# Patient Record
Sex: MALE | URBAN - METROPOLITAN AREA
[De-identification: names, ages, dates, MRNs, and addresses within clinical notes are randomized per-mention and may not be internally consistent; named-entity substitution may affect disease eponyms.]

---

## 2017-05-22 ENCOUNTER — PRE VISIT (OUTPATIENT)
Dept: NEUROLOGY | Facility: CLINIC | Age: 77
End: 2017-05-22

## 2017-05-22 NOTE — TELEPHONE ENCOUNTER
"PREVISIT INFORMATION                                                    Andi Parrish scheduled for future visit at [facility] specialty clinics.    Patient is scheduled to see Dr Humphrey on 5/23/17  Reason for visit: Facial numbness, getting worse over the past couple years. Inside of mouth, gums and tongue are numb. \"Stiff arm\" (mild stroke) and that was when the numbness in the mouth started.   Referring provider Dr Almaguer at [facility]  Has patient seen previous specialist? Yes.  Name of provider unknow, Clinic/Facility [facility].   Medical Records:  We do not have records. The pt was rescheduled to June 20th because we do not have any records.    REVIEW                                                      New patient packet mailed to patient: Yes  Medication reconciliation complete: No      Current Outpatient Prescriptions   Medication Sig Dispense Refill     CRESTOR OR 1 TABLET DAILY       ASPIRIN 81 MG OR CPEP 1 CAPSULE DAILY       TAMIFLU 75 MG OR CAPS 1 CAPSULE DAILY x 10 days 10 0       Allergies: Review of patient's allergies indicates no known allergies.        PLAN/FOLLOW-UP NEEDED                                                      The following is needed before upcoming appointment. [facility] records. We can access his PCP (Dr Almaguer) through Care Everywhere.     Patient Reminders Given:  Please, make sure you bring an updated list of your medications.   If you are having a procedure, please, present 15 minutes early.  If you need to cancel or reschedule,please call [phone].    Joyce Sanchez    "

## 2017-06-06 ENCOUNTER — TELEPHONE (OUTPATIENT)
Dept: NEUROLOGY | Facility: CLINIC | Age: 77
End: 2017-06-06

## 2017-06-06 NOTE — TELEPHONE ENCOUNTER
Care everywhere is accessed but patient had tests(MRI and EEG) done at [facility] and [facility]. Pt states that she had PRESTON sent to both clinics, he is going to fax a copy of PRESTON to me so I can follow up and make sure that we have copy of these tests before appt.  We did get a  Paper copy of records from [place] but not images for Dr. Humphrey to look at.  Darla Severin-Brown, LPN

## 2017-06-06 NOTE — TELEPHONE ENCOUNTER
[facility] Call Center    Phone Message    Name of Caller: Andi    Phone Number: Cell number on file:    Telephone Information:   Mobile [phone]       Best time to return call: any    May a detailed message be left on voicemail: yes    Relation to patient: Self    Reason for Call: Other: Patient is calling to follow up and see if the clinic has recieved his records. Please advise.     Action Taken: Message routed to:  Adult Clinics: Neurology p [phone]

## 2017-06-14 NOTE — TELEPHONE ENCOUNTER
PRESTON for Neil faxed to [phone] requesting records and MRIs on disk again.    Darla Severin-Brown, LPN

## 2017-06-19 ENCOUNTER — PRE VISIT (OUTPATIENT)
Dept: NEUROLOGY | Facility: CLINIC | Age: 77
End: 2017-06-19

## 2017-06-19 NOTE — TELEPHONE ENCOUNTER
Writer tried to reach the pt to complete the previsit but there was no answer, a message was left on their VM requesting a call back to the clinic.  Joyce Sanchez RN

## 2017-06-20 ENCOUNTER — OFFICE VISIT (OUTPATIENT)
Dept: NEUROLOGY | Facility: CLINIC | Age: 77
End: 2017-06-20
Payer: COMMERCIAL

## 2017-06-20 VITALS
BODY MASS INDEX: 28.67 KG/M2 | WEIGHT: 172.1 LBS | HEIGHT: 65 IN | HEART RATE: 51 BPM | DIASTOLIC BLOOD PRESSURE: 67 MMHG | SYSTOLIC BLOOD PRESSURE: 128 MMHG | OXYGEN SATURATION: 98 %

## 2017-06-20 DIAGNOSIS — K13.79 ORAL PAIN: ICD-10-CM

## 2017-06-20 DIAGNOSIS — H91.91 ACUTE HEARING LOSS OF RIGHT EAR: Primary | ICD-10-CM

## 2017-06-20 PROCEDURE — 99203 OFFICE O/P NEW LOW 30 MIN: CPT | Performed by: PSYCHIATRY & NEUROLOGY

## 2017-06-20 RX ORDER — ZOLPIDEM TARTRATE 10 MG/1
1.5 TABLET ORAL AT BEDTIME
Refills: 1 | COMMUNITY
Start: 2017-05-31 | End: 2021-01-07

## 2017-06-20 RX ORDER — FLUVASTATIN 20 MG/1
1 CAPSULE ORAL DAILY
Refills: 3 | COMMUNITY
Start: 2017-03-06 | End: 2021-01-07

## 2017-06-20 RX ORDER — NITROGLYCERIN 0.4 MG/1
0.4 TABLET SUBLINGUAL PRN
COMMUNITY
Start: 2013-03-07

## 2017-06-20 RX ORDER — CLOPIDOGREL BISULFATE 75 MG/1
1 TABLET ORAL DAILY
Refills: 3 | COMMUNITY
Start: 2017-03-31

## 2017-06-20 RX ORDER — PREGABALIN 50 MG/1
CAPSULE ORAL
Qty: 25 CAPSULE | Refills: 1 | Status: SHIPPED | OUTPATIENT
Start: 2017-06-20 | End: 2021-01-07

## 2017-06-20 RX ORDER — PREGABALIN 50 MG/1
CAPSULE ORAL
Qty: 90 CAPSULE | Status: CANCELLED | OUTPATIENT
Start: 2017-06-20

## 2017-06-20 RX ORDER — PREGABALIN 100 MG
1 CAPSULE ORAL 3 TIMES DAILY
Refills: 5 | COMMUNITY
Start: 2017-05-31 | End: 2017-10-24

## 2017-06-20 RX ORDER — DOXYCYCLINE 100 MG/1
CAPSULE ORAL PRN
Refills: 5 | COMMUNITY
Start: 2017-04-05

## 2017-06-20 RX ORDER — LISINOPRIL 10 MG/1
1 TABLET ORAL DAILY
Refills: 3 | COMMUNITY
Start: 2017-03-22 | End: 2021-01-07

## 2017-06-20 RX ORDER — TAMSULOSIN HYDROCHLORIDE 0.4 MG/1
CAPSULE ORAL PRN
Refills: 3 | COMMUNITY
Start: 2017-03-21 | End: 2021-01-03

## 2017-06-20 RX ORDER — PREDNISONE 20 MG/1
60 TABLET ORAL DAILY
Qty: 21 TABLET | Refills: 1 | Status: SHIPPED | OUTPATIENT
Start: 2017-06-20 | End: 2017-10-24

## 2017-06-20 ASSESSMENT — PAIN SCALES - GENERAL: PAINLEVEL: MODERATE PAIN (5)

## 2017-06-20 NOTE — MR AVS SNAPSHOT
After Visit Summary   6/20/2017    Andi Parrish    MRN: [id number]           Patient Information     Date Of Birth          1940        Visit Information        Provider Department      6/20/2017 2:00 PM Jeanmarie Humphrey MD [facility]        Today's Diagnoses     Acute hearing loss of right ear    -  1      Care Instructions    Thank you for choosing [facility] in [place]. It was a pleasure to see you for your office visit today.     The following is a summary of your office visit:    Medication started today: None. After stopping the Lyrica we will add Cymbalta.     Medication stopped today: Decrease your Lyrica dose to 50mg, one capsule three times a day for 5 days and then 1 capsule twice daily for 3 days and then stop. Call us after you are off this medication and we can send a prescription for Cymbalta.     Medication dose change: None    Appointments Made today:   1) We will arrange the Brain MRI and appt with Dr Contreras at the  and let you know the date/time.     Nurse/clinic contact information: [facility] [phone]    Further instructions for your care: Call if your symptoms change or worsen.     Take the extra course of Prednisone as instructed on the prescription sent to your pharmacy. If ENT decides to inject a steroid directly into the ear you can stop the oral prednisone.               Follow-ups after your visit        Your next 10 appointments already scheduled     Jun 21, 2017  9:30 AM CDT   Walk In From ENT with Ann Jara   [facility] ([facility])    [address] [place] [id number] [phone]            Jun 21, 2017 10:00 AM CDT   (Arrive by 9:45 AM)   New Patient Visit with Reinaldo Armstrong MD   [facility] ([facility])    [address] [place] [address] [id number] [phone]     "          Who to contact     If you have questions or need follow up information about today's clinic visit or your schedule please contact [facility] directly at [phone].  Normal or non-critical lab and imaging results will be communicated to you by MyChart, letter or phone within 4 business days after the clinic has received the results. If you do not hear from us within 7 days, please contact the clinic through MyChart or phone. If you have a critical or abnormal lab result, we will notify you by phone as soon as possible.  Submit refill requests through [employer] or call your pharmacy and they will forward the refill request to us. Please allow 3 business days for your refill to be completed.          Additional Information About Your Visit        Superplayer Information     [employer] is an electronic gateway that provides easy, online access to your medical records. With [employer]player, you can request a clinic appointment, read your test results, renew a prescription or communicate with your care team.     To sign up for [employer] visit the website at [email or web address]   You will be asked to enter the access code listed below, as well as some personal information. Please follow the directions to create your username and password.     Your access code is: [email or web address]  Expires: 2017  5:21 PM     Your access code will  in 90 days. If you need help or a new code, please contact your [facility] or call [phone] for assistance.        Care EveryWhere ID     This is your Care EveryWhere ID. This could be used by other organizations to access your [place] medical records  [id number]        Your Vitals Were     Pulse Height Pulse Oximetry BMI (Body Mass Index)          51 5' 5\" (1.651 m) 98% 28.64 kg/m2         Blood Pressure from Last 3 Encounters:   17 128/67   08 102/58    Weight from Last 3 Encounters:   17 172 lb 1.6 oz (78.1 kg) "   02/23/08 179 lb (81.2 kg)              Today, you had the following     No orders found for display         Today's Medication Changes          These changes are accurate as of: 6/20/17  5:21 PM.  If you have any questions, ask your nurse or doctor.               Start taking these medicines.        Dose/Directions    predniSONE 20 MG tablet   Commonly known as:  DELTASONE   Used for:  Acute hearing loss of right ear   Started by:  Jeanmarie Humphrey MD        Dose:  60 mg   Take 3 tablets (60 mg) by mouth daily Take for 1 week only or less as directed by ear specialist. Monitor blood glucose while taking (discuss with primary care).   Quantity:  21 tablet   Refills:  1         These medicines have changed or have updated prescriptions.        Dose/Directions    * LYRICA 100 MG capsule   This may have changed:  Another medication with the same name was added. Make sure you understand how and when to take each.   Generic drug:  pregabalin   Changed by:  Jeanmarie Humphrey MD        Dose:  1 capful   1 capful 3 times daily   Refills:  5       * pregabalin 50 MG capsule   Commonly known as:  LYRICA   This may have changed:  You were already taking a medication with the same name, and this prescription was added. Make sure you understand how and when to take each.   Used for:  Acute hearing loss of right ear   Changed by:  Jeanmarie Humphrey MD        Take 1 capsule 3 times a day for 5 days, then 1 capsule 2 times a day for 3 days, then stop.   Quantity:  25 capsule   Refills:  1       * Notice:  This list has 2 medication(s) that are the same as other medications prescribed for you. Read the directions carefully, and ask your doctor or other care provider to review them with you.         Where to get your medicines      Some of these will need a paper prescription and others can be bought over the counter.  Ask your nurse if you have questions.     Bring a paper prescription for each of these medications     predniSONE 20 MG tablet     pregabalin 50 MG capsule                Primary Care Provider Office Phone # Fax #    Husam Almaguer [phone]       [facility] [address] [address]        Thank you!     Thank you for choosing [facility]  for your care. Our goal is always to provide you with excellent care. Hearing back from our patients is one way we can continue to improve our services. Please take a few minutes to complete the written survey that you may receive in the mail after your visit with us. Thank you!             Your Updated Medication List - Protect others around you: Learn how to safely use, store and throw away your medicines at www.disposemymeds.org.          This list is accurate as of: 6/20/17  5:21 PM.  Always use your most recent med list.                   Brand Name Dispense Instructions for use    * ASPIRIN 81 MG Cpep      1 CAPSULE DAILY       * aspirin 81 MG EC tablet      Take by mouth daily       clopidogrel 75 MG tablet    PLAVIX     Take 1 tablet by mouth daily       CRESTOR PO      1 TABLET DAILY       doxycycline Monohydrate 100 MG Caps      as needed       fluvastatin 20 MG capsule    LESCOL     1 capsule daily       lisinopril 10 MG tablet    PRINIVIL/ZESTRIL     Take 1 tablet by mouth daily       * LYRICA 100 MG capsule   Generic drug:  pregabalin      1 capful 3 times daily       * pregabalin 50 MG capsule    LYRICA    25 capsule    Take 1 capsule 3 times a day for 5 days, then 1 capsule 2 times a day for 3 days, then stop.       metroNIDAZOLE 0.75 % cream    METROCREAM     as needed       nitroglycerin 0.4 MG sublingual tablet    NITROSTAT     Place 0.4 mg under the tongue as needed       predniSONE 20 MG tablet    DELTASONE    21 tablet    Take 3 tablets (60 mg) by mouth daily Take for 1 week only or less as directed by ear specialist. Monitor blood glucose while taking (discuss with primary care).       TAMIFLU 75 MG capsule   Generic drug:   oseltamivir     10    1 CAPSULE DAILY x 10 days       tamsulosin 0.4 MG capsule    FLOMAX     as needed       zolpidem 10 MG tablet    AMBIEN     1 tablet At Bedtime       * Notice:  This list has 4 medication(s) that are the same as other medications prescribed for you. Read the directions carefully, and ask your doctor or other care provider to review them with you.

## 2017-06-20 NOTE — PATIENT INSTRUCTIONS
Thank you for choosing [facility] in [place]. It was a pleasure to see you for your office visit today.     The following is a summary of your office visit:    Medication started today: None. After stopping the Lyrica we will add Cymbalta.     Medication stopped today: Decrease your Lyrica dose to 50mg, one capsule three times a day for 5 days and then 1 capsule twice daily for 3 days and then stop. Call us after you are off this medication and we can send a prescription for Cymbalta.     Medication dose change: None    Appointments Made today:   1) We will arrange the Brain MRI and appt with Dr Contreras at the  and let you know the date/time.     Nurse/clinic contact information: [facility] [phone]    Further instructions for your care: Call if your symptoms change or worsen.     Take the extra course of Prednisone as instructed on the prescription sent to your pharmacy. If ENT decides to inject a steroid directly into the ear you can stop the oral prednisone.

## 2017-06-20 NOTE — LETTER
2017      RE: Andi Parrish  [address] [place] [id number]     Dear Colleague,    Thank you for referring your patient, Andi Parrish, to the [facility]. Please see a copy of my visit note below.    [id number]    2017            Husam Almaguer MD   [facility]   [address], [address]   [place] [address]      RE:  Andi Parrish   MR:  [id number]   :  1940      Dear Dr. Almaguer:      I saw Andi Parrish in neuromuscular consultation today at the [facility] in [place] where he was referred for further evaluation of burning pain in the mouth.  Mr. Parrish is a 77-year-old man who recounts the following history:  In , he observed abrupt sensory changes involving the left upper limb and left side of the mouth.  He was seen in emergency evaluation.  MR of the brain demonstrated stigmata of cerebrovascular disease, but no evidence of acute infarction despite having been obtained several hours after onset of symptoms.  A cardiac observation stay was recommended but not carried out.  Since then, his sensory symptoms in the left upper limb have improved substantially, but he is left with a burning discomfort involving the first and second fingers of the left hand.  His mouth symptoms, however, have progressed gradually, such that he now has a painful and significantly distracting burning discomfort involving much of the inside of the mouth, both left and right, without other sensory alterations.  There is no sensory alteration over the skin of the face.  He occasionally finds himself having more difficulty articulating or slurring words.  There is no dysphagia except to the extent that the pain makes chewing more difficult.  He has noted no sustained benefit with gabapentin, or with pregabalin which he is currently taking at a dose of 100 mg three times a day and has done for  several months.  Since onset of these symptoms, he has also noted an ill-defined sensory difficulty with the right hand which, according to his wife, has affected his handwriting.  In addition, he has developed a superficial discomfort in bilateral feet which may have worsened over time as well.  He describes it as numbness, but upon further questioning, he denies an alteration in thermal or tactile perception in the feet.  Foot pain is more noticeable when weightbearing than at rest.  He denies paresthesias, has only equivocal symptoms of burning, and denies allodynia in the feet.  He does have a gait limitation of about one block which is due to leg pain in a more proximal location.  He denies weakness in the lower extremities and denies loss of sphincter control.      Review of systems is also notable for some ill-defined changes in cognition and irritability.  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  He also expresses concern about the potential for his progressive mouth pain to become more severe and disabling.  He does acknowledge that the pain is more bothersome when he is physically active or under stress.  He acknowledges some irritability or more difficulty with temper.  He does report a past history of depressed mood, but denies hospitalization for depression.  He has no suicidal ideation or plans, but does acknowledge that he considers physician-assisted suicide a reasonable option for individuals with intractable medical conditions and no meaningful quality of life. He is concerned  about several matters currently, including the prognosis of his pain as well as impending change in life circumstances.     His wife denies any concern about his temper being dangerous to others, or any change in personality being problematic, but she is concerned about the effect of stress on himself, given his substantial history of coronary artery disease.  Neither he nor his wife feel there has been a disabling cognitive decline, but they are clear about some changes that are of concern to both of them.  There is a family history of dementia, but not at an early age.     Finally, in the course of reviewing his current symptoms, the patient reports that within the past 1-2 weeks, he noticed a crackling sound and difficulty with auditory perception in his right ear when listening to books on tape.  Today, he noticed for the first time that he could not hear with his cell phone at all from his right ear.  He had no difficulty hearing with his cell phone placed over his left ear.  Today's observation was associated with a sound as if there were wind in the right ear.  It was not associated with pain.  It was not associated with vertigo, diplopia, visual loss, change in speech or voice, loss of sensation over the face, or loss of sensation, strength or coordination on either side of the body.  The patient has known widespread vascular disease, as noted above.      PHYSICAL EXAMINATION:   GENERAL:  Mr. Parrish is alert and cooperative.   VITAL SIGNS:  Normal.  Pulse was 51 when he was roomed, which is well within the range that has been documented previously.  Pulse is regular.   CARDIOVASCULAR:  There is no murmur at the cardiac base.  There is a right carotid bruit.  There is an audible sound on the right side of the neck which, based upon location and radiation, is most likely a carotid bruit rather than a radiated murmur.  There are no supraclavicular murmurs as might be heard with vertebral artery stenosis.  The  patient does have a recognized moderate severity stenosis of the right carotid artery.   EARS:  Examination of the right ear demonstrates a normal-appearing tympanic membrane with no cerumen.   EXTREMITIES:  Examination of the feet demonstrates no tenderness in the heels or plantar fascia.  There is no allodynia.      NEUROLOGIC EXAMINATION:  The patient is alert and cooperative.  Formal testing of attention, orientation and recall are normal.  Speech is fluent without paraphasic errors.  He was able to name only 5 T words in 60 seconds, however.  In addition, it was my impression that the descriptors were provided somewhat vaguely in his history.  This was not clearly abnormal, however.  Affect is appropriate.  Cranial nerve examination demonstrates equal, round, and reactive pupils.  There is some soft tissue laxity over the orbit, but there is no ptosis.  There is no diplopia or pathologic nystagmus with extraocular movement testing, and extraocular movements are full.  Auditory acuity is preserved in the left ear and absent in the right with crude bedside testing.  Face is symmetric with normal strength in upper and lower facial muscles.  Bulbar examination is normal.  Facial sensory perception is preserved to pin and touch.  Sternocleidomastoid, neck flexion and neck extension strength are normal.  Sensory examination reveals vibration scores of 2 at the great toes.  Perception of light touch is preserved.  Pinprick perception is slightly reduced at the toes relative to proximal sites and normalizes at the ankles.  Reflexes are 2+ throughout and symmetric.  Plantar responses are mute.  Extremity tone, bulk, strength and rapid repetitive movements are normal.  He walks independently.  Romberg sign is not present.  Rapid repetitive movements are performed normally in all four limbs.  There is no pronator drift.      Medical Record Review:  I personally reviewed MR imaging of the brain performed in the past.  This  does demonstrate evidence of subcortical ischemic disease which is mild to moderate in severity and does involve the thalami.  I personally reviewed his carotid ultrasound studies.  Flow voids appeared preserved on his brain MRI from 2015.      I explained the following to Mr. 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  Cranial mononeuropathy is less likely but difficult to exclude, as is an oral process.  Not noted above, examination of the oral cavity demonstrated no abnormalities in the mucosa.      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  I have also suggested consideration of a trial of duloxetine.  He is interested in doing this after he tapers off of pregabalin.  I have instructed him to contact me if, while tapering off of pregabalin, he notes an increase in his pain.  If so, he may be able to choose the lowest effective dose of pregabalin to continue.  We discussed the potential benefit and adverse effect profile of duloxetine as well.  I will also obtain a repeat brain MRI with attention to the skull base, lest his subjective progression of pain in the context of a new auditory deficit reflect a progressive cranial polyneuropathy.  The pretest probability of this, however, is relatively low, in my view.      With regard to the abrupt onset of hearing loss, I have discussed this with our on-call vascular neurology and ENT services.  The likelihood of cerebrovascular disease as the cause is felt to be quite low. I nonetheless did advise Mr. Parrish as to symptoms of stroke that should prompt immediate evaluation.  Our ENT Service is familiar with the syndrome of abrupt unilateral hearing loss, and recommended that he begin oral prednisone  tonight, barring contraindications.  I have discussed this with your on-call primary care team, and they have no objection to his starting steroids tonight.  He has an appointment tomorrow morning in our Neuro-otology Clinic for an audiogram and a neuro-otology evaluation.  They may choose, given his noninsulin dependent diabetes, to discontinue oral steroids and use an interim tympanic injection of steroid. The patient has been instructed to contact you for blood glucose monitoring should he remain on a course of steroids.     With respect to his foot pain, this has as many features that are mechanical as it does neuropathic.  I would suggest consideration of a Podiatry referral.  His gait limitation may likely reflect his known peripheral arterial disease.  That said, however, it has not improved with revascularization, which raises the possibility of lumbar spinal stenosis or other mechanical etiologies.  We did not discuss the option of evaluation for lumbar spinal stenosis in today's complex visit, but it could be considered.  Mr. Parrish, of course, is not an ideal candidate for a major surgical procedure, given his substantial vascular history, but this could be a consideration if he has a persistent and disabling gait limitation due to spinal claudication.      We also discussed the potential value of counseling.  He is approaching a rather significant change in his personal and professional circumstances, and was lexie in suggesting that he has defined himself by his work, and that making a change will be difficult.  I strongly encouraged counseling in this setting, and he will consider it.  Currently, he does not choose to proceed, however.      I also discussed the subtle cognitive and behavioral symptoms that he and his wife describe.  These may reflect some combination of adjustment reaction as well as distractibility due to pain and preoccupation, and may even reflect depression. Here too, he may  benefit from counseling and initiation of duloxetine.  I also explained to the Sh[facility] that infrequently there are forms of dementia that can most prominently affect language skills that can most prominently present with behavioral change and loss of language skills rather than memory.  I offered further assessment for this as well.  Currently, he is reluctant to consider this, but this also merits reassessment as his care proceeds.      I will meet with Mr. Parrish in follow-up to review response to the aforementioned interventions.  He is certainly welcome to arrange neurologic followup through the [facility] system as well, but appeared to express an interest in returning for further management.         Sincerely,      MELE MCCARTY MD             D: 2017 18:18   T: 2017 07:37   MT: [id number]      Name:     STERLING PARRISH   MRN:      [id number]        Account:      [id number]   :      1940      Document: [id number]       cc: Husam Almaguer MD       Again, thank you for allowing me to participate in the care of your patient.      Sincerely,    Mele Mccarty MD

## 2017-06-20 NOTE — Clinical Note
Dr Humphrey, please review the pending orders. Complete the Referral to dentistry. Also need a future order for creatinine signed which needs to be completed prior to the MRI.  Melisa, once the referral order is signed please assist the pt with scheduling an appt with Dr Anival Contreras at the center for dentistry at the [facility]. Please let the pt know the date/time. I am not sure they share epic with us so you may need to fax the referral and Dr Humphrey's office visit note to them. Joyce Sanchez RN

## 2017-06-20 NOTE — NURSING NOTE
"Andi Parrish's goals for this visit include: consult  He requests these members of his care team be copied on today's visit information:     PCP: Husam Almaguer    Referring Provider:  Husam Almaguer MD  [facility]  [address] [place] [id number]    Chief Complaint   Patient presents with     Consult       Initial /67  Pulse 51  Ht 1.651 m (5' 5\")  Wt 78.1 kg (172 lb 1.6 oz)  SpO2 98%  BMI 28.64 kg/m2 Estimated body mass index is 28.64 kg/(m^2) as calculated from the following:    Height as of this encounter: 1.651 m (5' 5\").    Weight as of this encounter: 78.1 kg (172 lb 1.6 oz).  Medication Reconciliation: complete    Do you need any medication refills at today's visit? n  "

## 2017-06-21 ENCOUNTER — OFFICE VISIT (OUTPATIENT)
Dept: OTOLARYNGOLOGY | Facility: CLINIC | Age: 77
End: 2017-06-21

## 2017-06-21 ENCOUNTER — OFFICE VISIT (OUTPATIENT)
Dept: AUDIOLOGY | Facility: CLINIC | Age: 77
End: 2017-06-21

## 2017-06-21 VITALS — WEIGHT: 170 LBS | BODY MASS INDEX: 29.02 KG/M2 | HEIGHT: 64 IN

## 2017-06-21 DIAGNOSIS — H90.3 SENSORY HEARING LOSS, BILATERAL: Primary | ICD-10-CM

## 2017-06-21 DIAGNOSIS — H91.21 SUDDEN IDIOPATHIC HEARING LOSS OF RIGHT EAR WITH RESTRICTED HEARING OF LEFT EAR: Primary | ICD-10-CM

## 2017-06-21 DIAGNOSIS — H91.90 HEARING LOSS: Primary | ICD-10-CM

## 2017-06-21 RX ORDER — DOXYCYCLINE HYCLATE 100 MG
TABLET ORAL
COMMUNITY
Start: 2010-09-20 | End: 2017-10-24

## 2017-06-21 ASSESSMENT — PAIN SCALES - GENERAL: PAINLEVEL: NO PAIN (0)

## 2017-06-21 NOTE — MR AVS SNAPSHOT
After Visit Summary   6/21/2017    Andi Parrish    MRN: [id number]           Patient Information     Date Of Birth          1940        Visit Information        Provider Department      6/21/2017 10:00 AM Reinaldo Armstrong MD [facility]        Today's Diagnoses     Hearing loss    -  1      Care Instructions    You will need  to schedule a follow up appointment in one week with an audio.  Continue on the prednisone. Please  the prescription for omeprazole at the pharmacy.  You will have a MRI done.   Please call our clinic for any questions,concerns,or worsening symptoms.      Clinic #[phone]       Option 3  for the triage nurse.          Follow-ups after your visit        Additional Services     AUDIOLOGY ADULT REFERRAL       Your provider has referred you to: [facility] [phone]   Fax [phone]    Treatment:  Evaluation & Treatment  Specialty Testing:  Audiogram w/Tymps and Reflexes    Please be aware that coverage of these services is subject to the terms and limitations of your health insurance plan.  Call member services at your health plan with any benefit or coverage questions.      Please bring the following to your appointment:  >>   Any x-rays, CTs or MRIs which have been performed.  Contact the facility where they were done to arrange for  prior to your scheduled appointment.  Any new CT, MRI or other procedures ordered by your specialist must be performed at a [facility] or coordinated by your clinic's referral office.   >>   List of current medications  >>   This referral request   >>   Any documents/labs given to you for this referral                  Your next 10 appointments already scheduled     Jun 24, 2017  4:00 PM CDT   (Arrive by 3:45 PM)   MR BRAIN W/O & W CONTRAST with [address] [facility] ([facility])    [address]  [facility] [address] [phone]           Take your medicines as usual, unless your doctor tells you not to. Bring a list of your current medicines to your exam (including vitamins, minerals and over-the-counter drugs).  You will be given intravenous contrast for this exam. To prepare:   The day before your exam, drink extra fluids at least six 8-ounce glasses (unless your doctor tells you to restrict your fluids).   Have a blood test (creatinine test) within 30 days of your exam. Go to your clinic or Diagnostic Imaging Department for this test.  The MRI machine uses a strong magnet. Please wear clothes without metal (snaps, zippers). A sweatsuit works well, or we may give you a hospital gown.  Please remove any body piercings and hair extensions before you arrive. You will also remove watches, jewelry, hairpins, wallets, dentures, partial dental plates and hearing aids. You may wear contact lenses, and you may be able to wear your rings. We have a safe place to keep your personal items, but it is safer to leave them at home.   **IMPORTANT** THE INSTRUCTIONS BELOW ARE ONLY FOR THOSE PATIENTS WHO HAVE BEEN TOLD THEY WILL RECEIVE SEDATION OR GENERAL ANESTHESIA DURING THEIR MRI PROCEDURE:  IF YOU WILL RECEIVE SEDATION (take medicine to help you relax during your exam):   You must get the medicine from your doctor before you arrive. Bring the medicine to the exam. Do not take it at home.   Arrive one hour early. Bring someone who can take you home after the test. Your medicine will make you sleepy. After the exam, you may not drive, take a bus or take a taxi by yourself.   No eating 8 hours before your exam. You may have clear liquids up until 4 hours before your exam. (Clear liquids include water, clear tea, black coffee and fruit juice without pulp.)  IF YOU WILL RECEIVE ANESTHESIA (be asleep for your exam):   Arrive 1 1/2 hours early. Bring someone who can take you home after the test. You may not  drive, take a bus or take a taxi by yourself.   No eating 8 hours before your exam. You may have clear liquids up until 4 hours before your exam. (Clear liquids include water, clear tea, black coffee and fruit juice without pulp.)  Please call the Imaging Department at your exam site with any questions.            Jun 28, 2017 10:00 AM CDT   Walk In From ENT with Ann Webster [facility] Audiology ([facility])    [address] [place] [address] [phone]            Jun 28, 2017 12:00 PM CDT   (Arrive by 11:45 AM)   Return Visit with MD ECTOR Aviles [facility] ([facility])    [address] [place] [address] [phone]              Future tests that were ordered for you today     Open Future Orders        Priority Expected Expires Ordered    AUDIOLOGY ADULT REFERRAL Routine 6/28/2017 12/18/2017 6/21/2017    MR Brain w/o & w Contrast Routine  6/20/2018 6/20/2017            Who to contact     Please call your clinic at [phone] to:    Ask questions about your health    Make or cancel appointments    Discuss your medicines    Learn about your test results    Speak to your doctor   If you have compliments or concerns about an experience at your clinic, or if you wish to file a complaint, please contact [facility] Patient Relations at [phone] or email us at [email or web address]         Additional Information About Your Visit        LiquidPracticehart Information     DecoSnap is an electronic gateway that provides easy, online access to your medical records. With DecoSnap, you can request a clinic appointment, read your test results, renew a prescription or communicate with your care team.     To sign up for DecoSnap visit the website at [email or web address]   You will be asked to enter the access code listed below, as well as some  "personal information. Please follow the directions to create your username and password.     Your access code is: [email or web address]  Expires: 2017  5:21 PM     Your access code will  in 90 days. If you need help or a new code, please contact your [facility] or call [phone] for assistance.        Care EveryWhere ID     This is your Care EveryWhere ID. This could be used by other organizations to access your [place] medical records  [id number]        Your Vitals Were     Height BMI (Body Mass Index)                1.62 m (5' 3.78\") 29.38 kg/m2           Blood Pressure from Last 3 Encounters:   17 128/67   08 102/58    Weight from Last 3 Encounters:   17 77.1 kg (170 lb)   17 78.1 kg (172 lb 1.6 oz)   08 81.2 kg (179 lb)                 Today's Medication Changes          These changes are accurate as of: 17 11:34 AM.  If you have any questions, ask your nurse or doctor.               Start taking these medicines.        Dose/Directions    omeprazole 20 MG CR capsule   Commonly known as:  priLOSEC   Used for:  Hearing loss   Started by:  Reinaldo Armstrong MD        Dose:  20 mg   Take 1 capsule (20 mg) by mouth daily   Quantity:  30 capsule   Refills:  1            Where to get your medicines      These medications were sent to [address] [address]     Phone:  [phone]     omeprazole 20 MG CR capsule                Primary Care Provider Office Phone # Fax #    Husam Almaguer [phone]       [facility] [address] [address]        Equal Access to Services     PINA AMEZCUA AH: Jessica Gates, bharathi bain, amelia beard, caryn millan. So [facility] [phone].    ATENCIÓN: Si habla español, tiene a romero disposición servicios gratuitos de asistencia lingüística. Llame " al [phone].    We comply with applicable federal civil rights laws and [place] laws. We do not discriminate on the basis of race, color, national origin, age, disability sex, sexual orientation or gender identity.            Thank you!     Thank you for choosing [facility]  for your care. Our goal is always to provide you with excellent care. Hearing back from our patients is one way we can continue to improve our services. Please take a few minutes to complete the written survey that you may receive in the mail after your visit with us. Thank you!             Your Updated Medication List - Protect others around you: Learn how to safely use, store and throw away your medicines at www.disposemymeds.org.          This list is accurate as of: 6/21/17 11:34 AM.  Always use your most recent med list.                   Brand Name Dispense Instructions for use Diagnosis    * ASPIRIN 81 MG Cpep      1 CAPSULE DAILY        * aspirin 81 MG EC tablet      Take by mouth daily        clopidogrel 75 MG tablet    PLAVIX     Take 1 tablet by mouth daily        CRESTOR PO      1 TABLET DAILY        doxycycline 100 MG tablet    VIBRA-TABS          doxycycline Monohydrate 100 MG Caps      as needed        fluvastatin 20 MG capsule    LESCOL     1 capsule daily        lisinopril 10 MG tablet    PRINIVIL/ZESTRIL     Take 1 tablet by mouth daily        * LYRICA 100 MG capsule   Generic drug:  pregabalin      1 capful 3 times daily        * pregabalin 50 MG capsule    LYRICA    25 capsule    Take 1 capsule 3 times a day for 5 days, then 1 capsule 2 times a day for 3 days, then stop.    Acute hearing loss of right ear       metroNIDAZOLE 0.75 % cream    METROCREAM     as needed        nitroglycerin 0.4 MG sublingual tablet    NITROSTAT     Place 0.4 mg under the tongue as needed        omeprazole 20 MG CR capsule    priLOSEC    30 capsule    Take 1 capsule (20 mg) by mouth daily    Hearing loss       predniSONE 20  MG tablet    DELTASONE    21 tablet    Take 3 tablets (60 mg) by mouth daily Take for 1 week only or less as directed by ear specialist. Monitor blood glucose while taking (discuss with primary care).    Acute hearing loss of right ear       TAMIFLU 75 MG capsule   Generic drug:  oseltamivir     10    1 CAPSULE DAILY x 10 days    Contact with or exposure to other communicable diseases(V01.89)       tamsulosin 0.4 MG capsule    FLOMAX     as needed        zolpidem 10 MG tablet    AMBIEN     1 tablet At Bedtime        * Notice:  This list has 4 medication(s) that are the same as other medications prescribed for you. Read the directions carefully, and ask your doctor or other care provider to review them with you.

## 2017-06-21 NOTE — PATIENT INSTRUCTIONS
You will need  to schedule a follow up appointment in one week with an audio.  Continue on the prednisone. Please  the prescription for omeprazole at the pharmacy.  You will have a MRI done.   Please call our clinic for any questions,concerns,or worsening symptoms.      Clinic #[phone]       Option 3  for the triage nurse.

## 2017-06-21 NOTE — NURSING NOTE
Chief Complaint   Patient presents with     Consult     Sudden hearing loss     Shankar Stewart LPN

## 2017-06-21 NOTE — MR AVS SNAPSHOT
After Visit Summary   2017    Andi Parrish    MRN: [id number]           Patient Information     Date Of Birth          1940        Visit Information        Provider Department      2017 9:30 AM Susanne Leach, Ann BARNEY [facility]iology        Today's Diagnoses     Sensory hearing loss, bilateral    -  1       Follow-ups after your visit        Future tests that were ordered for you today     Open Future Orders        Priority Expected Expires Ordered    MR Brain w/o & w Contrast Routine  2018            Who to contact     Please call your clinic at [phone] to:    Ask questions about your health    Make or cancel appointments    Discuss your medicines    Learn about your test results    Speak to your doctor   If you have compliments or concerns about an experience at your clinic, or if you wish to file a complaint, please contact [facility] Patient Relations at [phone] or email us at [email or web address]         Additional Information About Your Visit        MyChart Information     Money360 is an electronic gateway that provides easy, online access to your medical records. With Money360, you can request a clinic appointment, read your test results, renew a prescription or communicate with your care team.     To sign up for OpenCurriculumt visit the website at [email or web address]   You will be asked to enter the access code listed below, as well as some personal information. Please follow the directions to create your username and password.     Your access code is: [email or web address]  Expires: 2017  5:21 PM     Your access code will  in 90 days. If you need help or a new code, please contact your [facility] or call [phone] for assistance.        Care EveryWhere ID     This is your Care EveryWhere ID. This could be used by other organizations to access your [facility]  records  [id number]         Blood Pressure from Last 3 Encounters:   06/20/17 128/67   02/23/08 102/58    Weight from Last 3 Encounters:   06/20/17 78.1 kg (172 lb 1.6 oz)   02/23/08 81.2 kg (179 lb)              We Performed the Following     AUDIOGRAM/TYMPANOGRAM - INTERFACE     AUDIOLOGY ADULT REFERRAL     [facility] ([id number])     Tymps / Reflex   ([id number])        Primary Care Provider Office Phone # Fax #    Husam Almaguer [phone]       [facility] [address] [address]        Equal Access to Services     [facility]: Hadii aad ku hadasho Soomaali, waaxda luqadaha, qaybta kaalmada adeegyada, waxay idiin hayaan adeeg khjasmeet jean . So [facility] [phone].    ATENCIÓN: Si habla español, tiene a romero disposición servicios gratuitos de asistencia lingüística. Llame al [phone].    We comply with applicable federal civil rights laws and [place] laws. We do not discriminate on the basis of race, color, national origin, age, disability sex, sexual orientation or gender identity.            Thank you!     Thank you for choosing [facility] AUDIOLOGY  for your care. Our goal is always to provide you with excellent care. Hearing back from our patients is one way we can continue to improve our services. Please take a few minutes to complete the written survey that you may receive in the mail after your visit with us. Thank you!             Your Updated Medication List - Protect others around you: Learn how to safely use, store and throw away your medicines at www.disposemymeds.org.          This list is accurate as of: 6/21/17 10:35 AM.  Always use your most recent med list.                   Brand Name Dispense Instructions for use Diagnosis    * ASPIRIN 81 MG Cpep      1 CAPSULE DAILY        * aspirin 81 MG EC tablet      Take by mouth daily        clopidogrel 75 MG tablet    PLAVIX     Take 1 tablet by mouth daily        CRESTOR PO       1 TABLET DAILY        doxycycline Monohydrate 100 MG Caps      as needed        fluvastatin 20 MG capsule    LESCOL     1 capsule daily        lisinopril 10 MG tablet    PRINIVIL/ZESTRIL     Take 1 tablet by mouth daily        * LYRICA 100 MG capsule   Generic drug:  pregabalin      1 capful 3 times daily        * pregabalin 50 MG capsule    LYRICA    25 capsule    Take 1 capsule 3 times a day for 5 days, then 1 capsule 2 times a day for 3 days, then stop.    Acute hearing loss of right ear       metroNIDAZOLE 0.75 % cream    METROCREAM     as needed        nitroglycerin 0.4 MG sublingual tablet    NITROSTAT     Place 0.4 mg under the tongue as needed        predniSONE 20 MG tablet    DELTASONE    21 tablet    Take 3 tablets (60 mg) by mouth daily Take for 1 week only or less as directed by ear specialist. Monitor blood glucose while taking (discuss with primary care).    Acute hearing loss of right ear       TAMIFLU 75 MG capsule   Generic drug:  oseltamivir     10    1 CAPSULE DAILY x 10 days    Contact with or exposure to other communicable diseases(V01.89)       tamsulosin 0.4 MG capsule    FLOMAX     as needed        zolpidem 10 MG tablet    AMBIEN     1 tablet At Bedtime        * Notice:  This list has 4 medication(s) that are the same as other medications prescribed for you. Read the directions carefully, and ask your doctor or other care provider to review them with you.

## 2017-06-21 NOTE — PROGRESS NOTES
2017            Husam Almaguer MD   [facility]   [address]   [place] [address]      RE:  Andi Parrish   MR:  [id number]   :  1940      Dear Dr. Almaguer:      I saw Andi Parrish in neuromuscular consultation today at the [facility] in [place] where he was referred for further evaluation of burning pain in the mouth.  Mr. Parrish is a 77-year-old man who recounts the following history:  In , he observed abrupt sensory changes involving the left upper limb and left side of the mouth.  He was seen in emergency evaluation.  MR of the brain demonstrated stigmata of cerebrovascular disease, but no evidence of acute infarction despite having been obtained several hours after onset of symptoms.  A cardiac observation stay was recommended but not carried out.  Since then, his sensory symptoms in the left upper limb have improved substantially, but he is left with a burning discomfort involving the first and second fingers of the left hand.  His mouth symptoms, however, have progressed gradually, such that he now has a painful and significantly distracting burning discomfort involving much of the inside of the mouth, both left and right, without other sensory alterations.  There is no sensory alteration over the skin of the face.  He occasionally finds himself having more difficulty articulating or slurring words.  There is no dysphagia except to the extent that the pain makes chewing more difficult.  He has noted no sustained benefit with gabapentin, or with pregabalin which he is currently taking at a dose of 100 mg three times a day and has done for several months.  Since onset of these symptoms, he has also noted an ill-defined sensory difficulty with the right hand which, according to his wife, has affected his handwriting.  In addition, he has developed a superficial discomfort in bilateral feet which  may have worsened over time as well.  He describes it as numbness, but upon further questioning, he denies an alteration in thermal or tactile perception in the feet.  Foot pain is more noticeable when weightbearing than at rest.  He denies paresthesias, has only equivocal symptoms of burning, and denies allodynia in the feet.  He does have a gait limitation of about one block which is due to leg pain in a more proximal location.  He denies weakness in the lower extremities and denies loss of sphincter control.      Review of systems is also notable for some ill-defined changes in cognition and irritability.  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  He also expresses concern about the potential for his progressive mouth pain to become more severe and disabling.  He does acknowledge that the pain is more bothersome when he is physically active or under stress.  He acknowledges some irritability or more difficulty with temper.  He does report a past history of depressed mood, but denies hospitalization for depression.  He has no suicidal ideation or plans, but does acknowledge that he considers physician-assisted suicide a reasonable option for individuals with intractable medical conditions and no meaningful quality of life. He is concerned about several matters currently, including the prognosis of his pain as well as impending change in life circumstances.     His wife denies any concern about his temper being dangerous to others, or any change in personality being problematic, but she is  concerned about the effect of stress on himself, given his substantial history of coronary artery disease.  Neither he nor his wife feel there has been a disabling cognitive decline, but they are clear about some changes that are of concern to both of them.  There is a family history of dementia, but not at an early age.     Finally, in the course of reviewing his current symptoms, the patient reports that within the past 1-2 weeks, he noticed a crackling sound and difficulty with auditory perception in his right ear when listening to books on tape.  Today, he noticed for the first time that he could not hear with his cell phone at all from his right ear.  He had no difficulty hearing with his cell phone placed over his left ear.  Today's observation was associated with a sound as if there were wind in the right ear.  It was not associated with pain.  It was not associated with vertigo, diplopia, visual loss, change in speech or voice, loss of sensation over the face, or loss of sensation, strength or coordination on either side of the body.  The patient has known widespread vascular disease, as noted above.      PHYSICAL EXAMINATION:   GENERAL:  Mr. Parrish is alert and cooperative.   VITAL SIGNS:  Normal.  Pulse was 51 when he was roomed, which is well within the range that has been documented previously.  Pulse is regular.   CARDIOVASCULAR:  There is no murmur at the cardiac base.  There is a right carotid bruit.  There is an audible sound on the right side of the neck which, based upon location and radiation, is most likely a carotid bruit rather than a radiated murmur.  There are no supraclavicular murmurs as might be heard with vertebral artery stenosis.  The patient does have a recognized moderate severity stenosis of the right carotid artery.   EARS:  Examination of the right ear demonstrates a normal-appearing tympanic membrane with no cerumen.   EXTREMITIES:  Examination of the feet demonstrates no  tenderness in the heels or plantar fascia.  There is no allodynia.      NEUROLOGIC EXAMINATION:  The patient is alert and cooperative.  Formal testing of attention, orientation and recall are normal.  Speech is fluent without paraphasic errors.  He was able to name only 5 T words in 60 seconds, however.  In addition, it was my impression that the descriptors were provided somewhat vaguely in his history.  This was not clearly abnormal, however.  Affect is appropriate.  Cranial nerve examination demonstrates equal, round, and reactive pupils.  There is some soft tissue laxity over the orbit, but there is no ptosis.  There is no diplopia or pathologic nystagmus with extraocular movement testing, and extraocular movements are full.  Auditory acuity is preserved in the left ear and absent in the right with crude bedside testing.  Face is symmetric with normal strength in upper and lower facial muscles.  Bulbar examination is normal.  Facial sensory perception is preserved to pin and touch.  Sternocleidomastoid, neck flexion and neck extension strength are normal.  Sensory examination reveals vibration scores of 2 at the great toes.  Perception of light touch is preserved.  Pinprick perception is slightly reduced at the toes relative to proximal sites and normalizes at the ankles.  Reflexes are 2+ throughout and symmetric.  Plantar responses are mute.  Extremity tone, bulk, strength and rapid repetitive movements are normal.  He walks independently.  Romberg sign is not present.  Rapid repetitive movements are performed normally in all four limbs.  There is no pronator drift.      Medical Record Review:  I personally reviewed MR imaging of the brain performed in the past.  This does demonstrate evidence of subcortical ischemic disease which is mild to moderate in severity and does involve the thalami.  I personally reviewed his carotid ultrasound studies.  Flow voids appeared preserved on his brain MRI from 2015.      I  explained the following to Mr. 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  Cranial mononeuropathy is less likely but difficult to exclude, as is an oral process.  Not noted above, examination of the oral cavity demonstrated no abnormalities in the mucosa.      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  I have also suggested consideration of a trial of duloxetine.  He is interested in doing this after he tapers off of pregabalin.  I have instructed him to contact me if, while tapering off of pregabalin, he notes an increase in his pain.  If so, he may be able to choose the lowest effective dose of pregabalin to continue.  We discussed the potential benefit and adverse effect profile of duloxetine as well.  I will also obtain a repeat brain MRI with attention to the skull base, lest his subjective progression of pain in the context of a new auditory deficit reflect a progressive cranial polyneuropathy.  The pretest probability of this, however, is relatively low, in my view.      With regard to the abrupt onset of hearing loss, I have discussed this with our on-call vascular neurology and ENT services.  The likelihood of cerebrovascular disease as the cause is felt to be quite low. I nonetheless did advise Mr. Parrish as to symptoms of stroke that should prompt immediate evaluation.  Our ENT Service is familiar with the syndrome of abrupt unilateral hearing loss, and recommended that he begin oral prednisone tonight, barring contraindications.  I have discussed this with your on-call primary care team, and they have no objection to his starting steroids tonight.  He has an appointment tomorrow morning in our Neuro-otology Clinic for an audiogram and a  neuro-otology evaluation.  They may choose, given his noninsulin dependent diabetes, to discontinue oral steroids and use an interim tympanic injection of steroid. The patient has been instructed to contact you for blood glucose monitoring should he remain on a course of steroids.     With respect to his foot pain, this has as many features that are mechanical as it does neuropathic.  I would suggest consideration of a Podiatry referral.  His gait limitation may likely reflect his known peripheral arterial disease.  That said, however, it has not improved with revascularization, which raises the possibility of lumbar spinal stenosis or other mechanical etiologies.  We did not discuss the option of evaluation for lumbar spinal stenosis in today's complex visit, but it could be considered.  Mr. Parrish, of course, is not an ideal candidate for a major surgical procedure, given his substantial vascular history, but this could be a consideration if he has a persistent and disabling gait limitation due to spinal claudication.      We also discussed the potential value of counseling.  He is approaching a rather significant change in his personal and professional circumstances, and was lexie in suggesting that he has defined himself by his work, and that making a change will be difficult.  I strongly encouraged counseling in this setting, and he will consider it.  Currently, he does not choose to proceed, however.      I also discussed the subtle cognitive and behavioral symptoms that he and his wife describe.  These may reflect some combination of adjustment reaction as well as distractibility due to pain and preoccupation, and may even reflect depression. Here too, he may benefit from counseling and initiation of duloxetine.  I also explained to the Jessica that infrequently there are forms of dementia that can most prominently affect language skills that can most prominently present with behavioral change and loss of  language skills rather than memory.  I offered further assessment for this as well.  Currently, he is reluctant to consider this, but this also merits reassessment as his care proceeds.      I will meet with Mr. Parrish in follow-up to review response to the aforementioned interventions.  He is certainly welcome to arrange neurologic followup through the [facility] system as well, but appeared to express an interest in returning for further management.         Sincerely,      MELE MCCARTY MD             D: 2017 18:18   T: 2017 07:37   MT: [id number]      Name:     STERLING PARRISH   MRN:      [id number]        Account:      [id number]   :      1940      Document: [id number]       cc: Husam Almaguer MD

## 2017-06-21 NOTE — PROGRESS NOTES
"HISTORY OF PRESENT ILLNESS:  Mr. Parrish is a 77-year-old patient referred through Dr. Humphrey.  He has had a sudden hearing loss.  Last week, perhaps several weeks before that he had intermittent feelings of something wrong in his right ear.  He did not have a substantial problem, but in the last two or three days he has had a sudden decline in hearing in the right side with increasing tinnitus on that side.  The patient denies any symptoms of vertigo.  He has no fever or chills or other systemic signs.  The patient has been treated for essentially being a vasculopath.  He has significant cardiac history.  He has had bypass surgery and stents.  He is taking numerous medications.  The patient was under evaluation with Dr. Humphrey concerning numbness in his mouth and paresthesias in other parts of his body.  He has also had some difficulties with his eye sight and it is unclear what that is.  He tells me that he has a \"borderline diabetic condition\".  Apparently this is followed at another institution since records are not available for it here.      PHYSICAL EXAMINATION:  Examination today shows that the tympanic membranes are anatomically intact.  He has no nystagmus.  He has trouble with tandem gait.  He could not take more than two steps without side stepping.  He was able to do a Romberg and did finger-to-nose accurately.  There is no obvious nystagmus.  His facial nerve is a House-Brackmann class I.      AUDIOGRAM:  An audiogram was obtained.  Audiogram shows that the hearing in the left ear is relatively normal with a pure tone average of about 15 and a good word recognition score.  On the right side, he has a 60 dB loss with a 13% discrimination score.  His reflexes are curiously intact on that side.      IMAGING:  An MRI scan has been obtained in 2015 and shows no evidence for mass lesion along the internal auditory canals.  A more recent scan has been ordered but has not yet been obtained.      ASSESSMENT AND " PLAN:  Sudden hearing loss, right.  I talked with him about the possibility of steroid treatment.  Certainly this has the suggestion of a vascular origin.  Value of the steroids may be limited.  We talked about oral versus transtympanic steroids if he can tolerate the oral steroids he has already been started on.  We will give him some time with 60 mg a day.  I will see him again in one week, get a new audiogram and reassess.      SL/og

## 2017-06-21 NOTE — PROGRESS NOTES
AUDIOLOGY REPORT    SUMMARY: Audiology visit completed. See audiogram for results.      RECOMMENDATIONS: Follow-up with ENT.      Royal Cool, CCC-A  Licensed Audiologist  [place] #[id number]

## 2017-06-21 NOTE — LETTER
"6/21/2017       RE: Andi Parrish  [address] [place] [facility] [address]     Dear Colleague,    Thank you for referring your patient, Andi Parrish, to the [facility] NOSE [facility] THROAT at [facility]. Please see a copy of my visit note below.    HISTORY OF PRESENT ILLNESS:  Mr. Parrish is a 77-year-old patient referred through Dr. Humphrey.  He has had a sudden hearing loss.  Last week, perhaps several weeks before that he had intermittent feelings of something wrong in his right ear.  He did not have a substantial problem, but in the last two or three days he has had a sudden decline in hearing in the right side with increasing tinnitus on that side.  The patient denies any symptoms of vertigo.  He has no fever or chills or other systemic signs.  The patient has been treated for essentially being a vasculopath.  He has significant cardiac history.  He has had bypass surgery and stents.  He is taking numerous medications.  The patient was under evaluation with Dr. Humphrey concerning numbness in his mouth and paresthesias in other parts of his body.  He has also had some difficulties with his eye sight and it is unclear what that is.  He tells me that he has a \"borderline diabetic condition\".  Apparently this is followed at another institution since records are not available for it here.      PHYSICAL EXAMINATION:  Examination today shows that the tympanic membranes are anatomically intact.  He has no nystagmus.  He has trouble with tandem gait.  He could not take more than two steps without side stepping.  He was able to do a Romberg and did finger-to-nose accurately.  There is no obvious nystagmus.  His facial nerve is a House-Brackmann class I.      AUDIOGRAM:  An audiogram was obtained.  Audiogram shows that the hearing in the left ear is relatively normal with a pure tone average of about 15 and a good word recognition score.  On the right side, he has a 60 dB loss with " a 13% discrimination score.  His reflexes are curiously intact on that side.      IMAGING:  An MRI scan has been obtained in 2015 and shows no evidence for mass lesion along the internal auditory canals.  A more recent scan has been ordered but has not yet been obtained.      ASSESSMENT AND PLAN:  Sudden hearing loss, right.  I talked with him about the possibility of steroid treatment.  Certainly this has the suggestion of a vascular origin.  Value of the steroids may be limited.  We talked about oral versus transtympanic steroids if he can tolerate the oral steroids he has already been started on.  We will give him some time with 60 mg a day.  I will see him again in one week, get a new audiogram and reassess.      SL/lz         Again, thank you for allowing me to participate in the care of your patient.      Sincerely,    Reinaldo Armstrong MD

## 2017-06-22 DIAGNOSIS — H91.90 HEARING LOSS: Primary | ICD-10-CM

## 2017-06-26 ENCOUNTER — TELEPHONE (OUTPATIENT)
Dept: NEUROLOGY | Facility: CLINIC | Age: 77
End: 2017-06-26

## 2017-06-26 ENCOUNTER — CARE COORDINATION (OUTPATIENT)
Dept: OTOLARYNGOLOGY | Facility: CLINIC | Age: 77
End: 2017-06-26

## 2017-06-26 DIAGNOSIS — M79.2 NEUROPATHIC PAIN: Primary | ICD-10-CM

## 2017-06-26 NOTE — TELEPHONE ENCOUNTER
Writer spoke to the pt and he was informed of his negative MRI scan. He informed us that today is his last dose of Lyrica and would like a prescription for Duloxetine sent to his pharmacy. He was given the phone number to Dr Contreras's office to schedule an appt in the dental clinic. He was also set up for a 60 minute follow up appt with Dr Humphrey to go over all the things he recommended. Routed to Dr Humphrey to place order and he will keep us posted on how the new medication goes.  Joyce Sanchez RN

## 2017-06-26 NOTE — TELEPHONE ENCOUNTER
[facility] Call Center    Phone Message    Name of Caller: Andi    Phone Number   Telephone Information:   Mobile [phone]       Best time to return call: Any    May a detailed message be left on voicemail: yes    Relation to patient: Self    Reason for Call: Other: Pt [facility]  Patient would like to discuss alternative for Lyrica or what was recommened. Patient would also like to discuss MRI results and lastly patient would like set up appointment from what was discussed at last office visit for dental pain. Please advise.    Action Taken: Message routed to:  Adult Clinics: Neurology p [id number]

## 2017-06-26 NOTE — PROGRESS NOTES
Dr. Armstrong has reviewed the MRI done 6-24-17. The IAC and cochlear are normal. This is minimal scar in the mastoid and this is not significant. The images show no obvious source for the sudden hearing change. This message left on pt voice mail- pt has RTC 6-28-17  Trinity Melara RN  ENT Care Coordinator   Otology  [phone]

## 2017-06-27 RX ORDER — DULOXETIN HYDROCHLORIDE 30 MG/1
CAPSULE, DELAYED RELEASE ORAL
Qty: 60 CAPSULE | Refills: 3 | Status: SHIPPED | OUTPATIENT
Start: 2017-06-27 | End: 2021-01-07

## 2017-06-27 NOTE — TELEPHONE ENCOUNTER
[facility] Call Center    Phone Message    Name of Caller: STERLING NOVA    Phone Number: Home number on file [phone] (home)    Best time to return call: Please call when prescription is filled.       [facility] - [place], [place] - [address] [place] [address]      May a detailed message be left on voicemail: yes    Relation to patient: Self    Reason for Call: Other: Pt is requesting a call back when rx is filled.      Action Taken: Message routed to:  Adult Clinics: Neurology p [id number]

## 2017-06-28 ENCOUNTER — OFFICE VISIT (OUTPATIENT)
Dept: AUDIOLOGY | Facility: CLINIC | Age: 77
End: 2017-06-28
Attending: OTOLARYNGOLOGY

## 2017-06-28 ENCOUNTER — OFFICE VISIT (OUTPATIENT)
Dept: OTOLARYNGOLOGY | Facility: CLINIC | Age: 77
End: 2017-06-28

## 2017-06-28 VITALS — HEIGHT: 63 IN | WEIGHT: 170 LBS | BODY MASS INDEX: 30.12 KG/M2

## 2017-06-28 DIAGNOSIS — H93.11 TINNITUS OF RIGHT EAR: ICD-10-CM

## 2017-06-28 DIAGNOSIS — H90.3 SENSORINEURAL HEARING LOSS (SNHL) OF BOTH EARS: ICD-10-CM

## 2017-06-28 DIAGNOSIS — H91.21 SUDDEN IDIOPATHIC HEARING LOSS OF RIGHT EAR WITH RESTRICTED HEARING OF LEFT EAR: ICD-10-CM

## 2017-06-28 DIAGNOSIS — H90.6 MIXED HEARING LOSS, BILATERAL: Primary | ICD-10-CM

## 2017-06-28 RX ORDER — PREDNISONE 5 MG/1
TABLET ORAL
Qty: 12 TABLET | Refills: 0 | Status: SHIPPED | OUTPATIENT
Start: 2017-06-28 | End: 2017-10-24

## 2017-06-28 ASSESSMENT — PAIN SCALES - GENERAL: PAINLEVEL: NO PAIN (0)

## 2017-06-28 NOTE — PATIENT INSTRUCTIONS
The plan will be to start tappering the steroids starting on 7-4-17. Also, Dr Armstrong would like to see you in clinic with a pre visit audiogram on 7-7-17. For questions or concerns please call the RN triage line at [phone] option #3.   Teo Rayo RN  [phone]

## 2017-06-28 NOTE — PROGRESS NOTES
Pt with sudden hearing loss and now with improvement after a start on steroids.  He is one week into treatment.  There is no dizziness at this point.      MRI is negative for tumor or stroke    The exam is negative.    Audio shows improvement  Now with a rising pattern.    Impression:  Improving sudden hearing loss with steroids.    Plan:  Week two of steroids and then new audio and start a taper.

## 2017-06-28 NOTE — PROGRESS NOTES
AUDIOLOGY REPORT    SUMMARY: Audiology visit completed. See audiogram for results.      RECOMMENDATIONS: Follow-up with ENT.    Lakshmi White.  Licensed Audiologist  [place] #[id number]

## 2017-06-28 NOTE — LETTER
6/28/2017       RE: Andi Parrish  [address] [place] [address]     Dear Colleague,    Thank you for referring your patient, Andi Parrish, to the [facility] EAR NOSE AND THROAT at [facility]. Please see a copy of my visit note below.    Pt with sudden hearing loss and now with improvement after a start on steroids.  He is one week into treatment.  There is no dizziness at this point.      MRI is negative for tumor or stroke    The exam is negative.    Audio shows improvement  Now with a rising pattern.    Impression:  Improving sudden hearing loss with steroids.    Plan:  Week two of steroids and then new audio and start a taper.    Again, thank you for allowing me to participate in the care of your patient.      Sincerely,    Reinaldo Armstrong MD

## 2017-06-28 NOTE — TELEPHONE ENCOUNTER
The pt was notified that the medication was sent to his pharmacy. He was instructed on the common/serious side effects.  Joyce Sanchez RN

## 2017-06-28 NOTE — MR AVS SNAPSHOT
After Visit Summary   6/28/2017    Anid Parrish    MRN: [id number]           Patient Information     Date Of Birth          1940        Visit Information        Provider Department      6/28/2017 12:00 PM Reinaldo Armstrong MD M [facility] Ear Nose and Throat        Today's Diagnoses     Mixed hearing loss, bilateral    -  1      Care Instructions    The plan will be to start tappering the steroids starting on 7-4-17. Also, Dr Armstrong would like to see you in clinic with a pre visit audiogram on 7-7-17. For questions or concerns please call the RN triage line at [phone] option #3.   Teo Rayo ,LUIS FERNANDO  [phone]              Follow-ups after your visit        Your next 10 appointments already scheduled     Jul 07, 2017 11:00 AM CDT   Walk In From ENT with Ann Mackay   [facility] ([facility])    [address] [place] [address] [phone]            Jul 07, 2017 11:45 AM CDT   (Arrive by 11:30 AM)   Return Visit with Reinaldo Armstrong MD   [facility] ([facility])    [address] [place] [address] [phone]            Aug 08, 2017  9:00 AM CDT   Return Visit with Jeanmarie Humphrey MD   [facility] ([facility]    [address] [place] [address] [phone]              Future tests that were ordered for you today     Open Future Orders        Priority Expected Expires Ordered    AUDIOLOGY ADULT REFERRAL Routine  12/31/2017 7/4/2017            Who to contact     Please call your clinic at [phone] to:    Ask questions about your health    Make or cancel appointments    Discuss your medicines    Learn about your test results    Speak to your doctor   If you have compliments or concerns about an experience at your clinic, or if you wish to file a complaint, please contact [facility]  "Physicians Patient Relations at [phone] or email us at [email or web address]         Additional Information About Your Visit        [employer] Information     [employer] is an electronic gateway that provides easy, online access to your medical records. With [employer], you can request a clinic appointment, read your test results, renew a prescription or communicate with your care team.     To sign up for [employer] visit the website at [email or web address]   You will be asked to enter the access code listed below, as well as some personal information. Please follow the directions to create your username and password.     Your access code is: [email or web address]  Expires: 2017  5:21 PM     Your access code will  in 90 days. If you need help or a new code, please contact your [facility] or call [phone] for assistance.        Care EveryWhere ID     This is your Care EveryWhere ID. This could be used by other organizations to access your [place] medical records  [id number]        Your Vitals Were     Height BMI (Body Mass Index)                1.6 m (5' 3\") 30.11 kg/m2           Blood Pressure from Last 3 Encounters:   17 128/67   08 102/58    Weight from Last 3 Encounters:   17 77.1 kg (170 lb)   17 77.1 kg (170 lb)   17 78.1 kg (172 lb 1.6 oz)              Today, you had the following     No orders found for display         Today's Medication Changes          These changes are accurate as of: 17 11:59 PM.  If you have any questions, ask your nurse or doctor.               These medicines have changed or have updated prescriptions.        Dose/Directions    * predniSONE 20 MG tablet   Commonly known as:  DELTASONE   This may have changed:  Another medication with the same name was added. Make sure you understand how and when to take each.   Used for:  Acute hearing loss of right ear   Changed by:  Jeanmarie Humphrey MD        Dose:  " 60 mg   Take 3 tablets (60 mg) by mouth daily Take for 1 week only or less as directed by ear specialist. Monitor blood glucose while taking (discuss with primary care).   Quantity:  21 tablet   Refills:  1       * predniSONE 5 MG tablet   Commonly known as:  DELTASONE   This may have changed:  You were already taking a medication with the same name, and this prescription was added. Make sure you understand how and when to take each.   Used for:  Mixed hearing loss, bilateral   Changed by:  Reinaldo Armstrong MD        Starting Tuesday 7-4-17 take 30 mg (3 Tabs) for 3 days , then take 15 mg (1.5 Tabs) for three days then discontinue   Quantity:  12 tablet   Refills:  0       * Notice:  This list has 2 medication(s) that are the same as other medications prescribed for you. Read the directions carefully, and ask your doctor or other care provider to review them with you.         Where to get your medicines      These medications were sent to [address] [address]     Phone:  [phone]     predniSONE 5 MG tablet                Primary Care Provider Office Phone # Fax #    Husam Almaguer [phone]       [facility] [address] [address]        Equal Access to Services     PINA AMEZCUA AH: Hadii rina sanders hadasho Soomaali, waaxda luqadaha, qaybta kaalmada adeegyada, caryn millan. So [facility] [phone].    ATENCIÓN: Si habla español, tiene a romero disposición servicios gratuitos de asistencia lingüística. Llame al [phone].    We comply with applicable federal civil rights laws and [place] laws. We do not discriminate on the basis of race, color, national origin, age, disability sex, sexual orientation or gender identity.            Thank you!     Thank you for choosing [facility] EAR NOSE AND THROAT  for your care. Our goal is always to provide you with excellent care.  Hearing back from our patients is one way we can continue to improve our services. Please take a few minutes to complete the written survey that you may receive in the mail after your visit with us. Thank you!             Your Updated Medication List - Protect others around you: Learn how to safely use, store and throw away your medicines at www.disposemymeds.org.          This list is accurate as of: 6/28/17 11:59 PM.  Always use your most recent med list.                   Brand Name Dispense Instructions for use Diagnosis    * ASPIRIN 81 MG Cpep      1 CAPSULE DAILY        * aspirin 81 MG EC tablet      Take by mouth daily        clopidogrel 75 MG tablet    PLAVIX     Take 1 tablet by mouth daily        CRESTOR PO      1 TABLET DAILY        doxycycline 100 MG tablet    VIBRA-TABS          doxycycline Monohydrate 100 MG Caps      as needed        DULoxetine 30 MG EC capsule    CYMBALTA    60 capsule    Take 1 capsule every morning for 5 days, then two capsules every morning.    Neuropathic pain       fluvastatin 20 MG capsule    LESCOL     1 capsule daily        lisinopril 10 MG tablet    PRINIVIL/ZESTRIL     Take 1 tablet by mouth daily        * LYRICA 100 MG capsule   Generic drug:  pregabalin      1 capful 3 times daily        * pregabalin 50 MG capsule    LYRICA    25 capsule    Take 1 capsule 3 times a day for 5 days, then 1 capsule 2 times a day for 3 days, then stop.    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 Monitor  blood glucose while taking (discuss with primary care).    Acute hearing loss of right ear       * predniSONE 5 MG tablet    DELTASONE    12 tablet    Starting Tuesday 7-4-17 take 30 mg (3 Tabs) for 3 days , then take 15 mg (1.5 Tabs) for three days then discontinue    Mixed hearing loss, bilateral       TAMIFLU 75 MG capsule   Generic drug:  oseltamivir     10    1 CAPSULE DAILY x 10 days    Communic dis contact NEC       tamsulosin 0.4 MG capsule    FLOMAX     as needed        zolpidem 10 MG tablet    AMBIEN     1 tablet At Bedtime        * Notice:  This list has 6 medication(s) that are the same as other medications prescribed for you. Read the directions carefully, and ask your doctor or other care provider to review them with you.

## 2017-06-28 NOTE — MR AVS SNAPSHOT
After Visit Summary   6/28/2017    Andi Parrish    MRN: [id number]           Patient Information     Date Of Birth          1940        Visit Information        Provider Department      6/28/2017 10:00 AM Alejandra Garsia AuD 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 Ear Nose and Throat ([facility])    [address]  [address] [facility] [address] [phone]            Aug 08, 2017  9:00 AM CDT   Return Visit with Jeanmarie Humphrey MD   [facility]    [address] [place] [address] [phone]              Who to contact     Please call your clinic at [phone] to:    Ask questions about your health    Make or cancel appointments    Discuss your medicines    Learn about your test results    Speak to your doctor   If you have compliments or concerns about an experience at your clinic, or if you wish to file a complaint, please contact [facility] Physicians Patient Relations at [phone] or email us at [email or web address]         Additional Information About Your Visit        MyChart Information     [employer]t is an electronic gateway that provides easy, online access to your medical records. With [employer], you can request a clinic appointment, read your test results, renew a prescription or communicate with your care team.     To sign up for [employer]t visit the website at [email or web address]   You will be asked to enter the access code listed below, as well as some personal information. Please follow  the directions to create your username and password.     Your access code is: [email or web address]  Expires: 2017  5:21 PM     Your access code will  in 90 days. If you need help or a new code, please contact your [facility] or call [phone] for assistance.        Care EveryWhere ID     This is your Care EveryWhere ID. This could be used by other organizations to access your [place] medical records  [id number]         Blood Pressure from Last 3 Encounters:   17 128/67   08 102/58    Weight from Last 3 Encounters:   17 77.1 kg (170 lb)   17 78.1 kg (172 lb 1.6 oz)   08 81.2 kg (179 lb)              We Performed the Following     AUDIOGRAM/TYMPANOGRAM - INTERFACE     AUDIOLOGY ADULT REFERRAL     AUDIOLOGY ADULT REFERRAL     [facility] ([address])     Tymps / Reflex   ([address])        Primary Care Provider Office Phone # Fax #    Husam Almaguer [phone]       [facility] [address] [address]        Equal Access to Services     INA AMEZCUA AH: Hadii aad ku hadasho Soomaali, waaxda luqadaha, qaybta kaalmada adeegyada, waxay idiin hayaan adeeg kharash la'ag ah. So [facility] [phone].    ATENCIÓN: Si habla español, tiene a romero disposición servicios gratuitos de asistencia lingüística. Llame al [phone].    We comply with applicable federal civil rights laws and [place] laws. We do not discriminate on the basis of race, color, national origin, age, disability sex, sexual orientation or gender identity.            Thank you!     Thank you for choosing [facility] AUDIOLOGY  for your care. Our goal is always to provide you with excellent care. Hearing back from our patients is one way we can continue to improve our services. Please take a few minutes to complete the written survey that you may receive in the mail after your visit with us. Thank you!             Your Updated  Medication List - Protect others around you: Learn how to safely use, store and throw away your medicines at www.disposemymeds.org.          This list is accurate as of: 6/28/17 10:39 AM.  Always use your most recent med list.                   Brand Name Dispense Instructions for use Diagnosis    * ASPIRIN 81 MG Cpep      1 CAPSULE DAILY        * aspirin 81 MG EC tablet      Take by mouth daily        clopidogrel 75 MG tablet    PLAVIX     Take 1 tablet by mouth daily        CRESTOR PO      1 TABLET DAILY        doxycycline 100 MG tablet    VIBRA-TABS          doxycycline Monohydrate 100 MG Caps      as needed        DULoxetine 30 MG EC capsule    CYMBALTA    60 capsule    Take 1 capsule every morning for 5 days, then two capsules every morning.    Neuropathic pain       fluvastatin 20 MG capsule    LESCOL     1 capsule daily        lisinopril 10 MG tablet    PRINIVIL/ZESTRIL     Take 1 tablet by mouth daily        * LYRICA 100 MG capsule   Generic drug:  pregabalin      1 capful 3 times daily        * pregabalin 50 MG capsule    LYRICA    25 capsule    Take 1 capsule 3 times a day for 5 days, then 1 capsule 2 times a day for 3 days, then stop.    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 Monitor blood glucose while taking (discuss with primary care).    Acute hearing loss of right ear       TAMIFLU 75 MG capsule   Generic drug:  oseltamivir     10    1 CAPSULE DAILY x 10 days    Communic dis contact NEC       tamsulosin 0.4 MG  capsule    FLOMAX     as needed        zolpidem 10 MG tablet    AMBIEN     1 tablet At Bedtime        * Notice:  This list has 4 medication(s) that are the same as other medications prescribed for you. Read the directions carefully, and ask your doctor or other care provider to review them with you.

## 2017-07-03 ENCOUNTER — TELEPHONE (OUTPATIENT)
Dept: NEUROLOGY | Facility: CLINIC | Age: 77
End: 2017-07-03

## 2017-07-03 NOTE — TELEPHONE ENCOUNTER
I called and left message with Dental clinic to call us back regarding getting appt with Dr. 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    Darla Severin-Brown, LPN

## 2017-07-04 DIAGNOSIS — H91.90 HEARING LOSS: Primary | ICD-10-CM

## 2017-07-05 NOTE — TELEPHONE ENCOUNTER
I sent a communication to Dr Contreras and cc'ed Joyce Sanchez, as I will be out of the office the next two days. Hopefully I simply referred the patient to the wrong clinic but if he is no longer available perhaps he can suggest a best choice for a burning mouth/oral pain referral.

## 2017-07-05 NOTE — TELEPHONE ENCOUNTER
[facility] Call Center    Phone Message    Name of Caller: Agus    Phone Number: Other phone number:  [phone]    Best time to return call: Any    May a detailed message be left on voicemail: yes    Relation to patient: Other Name: agus  Relationship: Dental Clinic  Is there legal documentation in chart to discuss information with this person: NA    Reason for Call: Other: Agus is calling to touch base about a VM that was left on the machine at the dental clinic at the [facility]. She would like to inform Dr. Humphrey and team that they do not have a Dr. Contreras at that clinic and that the oral pathologist is Dr. Dodge. The referral would need to be faxed over to [phone]. Dr. Dodge only sees 2 patients a week and is booked out until October. Once they receive the referral they will contact the patient to schedule. Please advise as needed.     Action Taken: Message routed to:  Adult Clinics: Neurology p [id number]

## 2017-07-07 ENCOUNTER — OFFICE VISIT (OUTPATIENT)
Dept: OTOLARYNGOLOGY | Facility: CLINIC | Age: 77
End: 2017-07-07

## 2017-07-07 ENCOUNTER — OFFICE VISIT (OUTPATIENT)
Dept: AUDIOLOGY | Facility: CLINIC | Age: 77
End: 2017-07-07
Attending: OTOLARYNGOLOGY

## 2017-07-07 VITALS — HEIGHT: 63 IN | BODY MASS INDEX: 29.59 KG/M2 | WEIGHT: 167 LBS

## 2017-07-07 DIAGNOSIS — H90.41 SENSORINEURAL HEARING LOSS (SNHL) OF RIGHT EAR WITH UNRESTRICTED HEARING OF LEFT EAR: Primary | ICD-10-CM

## 2017-07-07 DIAGNOSIS — H91.21 SUDDEN HEARING LOSS, RIGHT: Primary | ICD-10-CM

## 2017-07-07 ASSESSMENT — PAIN SCALES - GENERAL: PAINLEVEL: NO PAIN (0)

## 2017-07-07 NOTE — MR AVS SNAPSHOT
After Visit Summary   2017    Andi Parrish    MRN: [id number]           Patient Information     Date Of Birth          1940        Visit Information        Provider Department      2017 11:45 AM Reinaldo Armstrong MD [facility] Ear Nose and Throat        Today's Diagnoses     Sudden hearing loss, right    -  1      Care Instructions       Please call our clinic for any questions,concerns,or worsening symptoms.      Clinic #[phone]       Option 3  for the triage nurse.          Follow-ups after your visit        Your next 10 appointments already scheduled     Aug 08, 2017  9:00 AM CDT   Return Visit with Jeanmarie Humphrey MD   [facility]    [address] [place] [address] [phone]              Who to contact     Please call your clinic at [phone] to:    Ask questions about your health    Make or cancel appointments    Discuss your medicines    Learn about your test results    Speak to your doctor   If you have compliments or concerns about an experience at your clinic, or if you wish to file a complaint, please contact [facility] Physicians Patient Relations at [phone] or email us at [email or web address]         Additional Information About Your Visit        MyChart Information     [employer] is an electronic gateway that provides easy, online access to your medical records. With [employer], you can request a clinic appointment, read your test results, renew a prescription or communicate with your care team.     To sign up for [employer] visit the website at [email or web address]   You will be asked to enter the access code listed below, as well as some personal information. Please follow the directions to create your username and password.     Your access code is: [email or web address]  Expires: 2017  5:21 PM     Your access code will  in 90 days. If you need help or a new code, please  "contact your [facility] or call [phone] for assistance.        Care EveryWhere ID     This is your Care EveryWhere ID. This could be used by other organizations to access your [place] medical records  [id number]        Your Vitals Were     Height BMI (Body Mass Index)                1.6 m (5' 3\") 29.58 kg/m2           Blood Pressure from Last 3 Encounters:   06/20/17 128/67   02/23/08 102/58    Weight from Last 3 Encounters:   07/07/17 75.8 kg (167 lb)   06/28/17 77.1 kg (170 lb)   06/21/17 77.1 kg (170 lb)              Today, you had the following     No orders found for display       Primary Care Provider Office Phone # Fax #    Husam Almaguer [phone]       [facility] [address] [address]        Equal Access to Services     [facility]: Hadii aad ku hadasho Soomaali, waaxda luqadaha, qaybta kaalmada adeegyada, waxay idiin hayaan adeeg kharaty jean . So [facility] [phone].    ATENCIÓN: Si habla español, tiene a romero disposición servicios gratuitos de asistencia lingüística. Ariel al [phone].    We comply with applicable federal civil rights laws and [place] laws. We do not discriminate on the basis of race, color, national origin, age, disability sex, sexual orientation or gender identity.            Thank you!     Thank you for choosing [facility] EAR NOSE AND THROAT  for your care. Our goal is always to provide you with excellent care. Hearing back from our patients is one way we can continue to improve our services. Please take a few minutes to complete the written survey that you may receive in the mail after your visit with us. Thank you!             Your Updated Medication List - Protect others around you: Learn how to safely use, store and throw away your medicines at www.disposemymeds.org.          This list is accurate as of: 7/7/17 11:59 PM.  Always use your most recent med list.                   Brand Name " Dispense Instructions for use Diagnosis    * ASPIRIN 81 MG Cpep      1 CAPSULE DAILY        * aspirin 81 MG EC tablet      Take by mouth daily        clopidogrel 75 MG tablet    PLAVIX     Take 1 tablet by mouth daily        CRESTOR PO      1 TABLET DAILY        doxycycline 100 MG tablet    VIBRA-TABS          doxycycline Monohydrate 100 MG Caps      as needed        DULoxetine 30 MG EC capsule    CYMBALTA    60 capsule    Take 1 capsule every morning for 5 days, then two capsules every morning.    Neuropathic pain       fluvastatin 20 MG capsule    LESCOL     1 capsule daily        lisinopril 10 MG tablet    PRINIVIL/ZESTRIL     Take 1 tablet by mouth daily        * LYRICA 100 MG capsule   Generic drug:  pregabalin      1 capful 3 times daily        * pregabalin 50 MG capsule    LYRICA    25 capsule    Take 1 capsule 3 times a day for 5 days, then 1 capsule 2 times a day for 3 days, then stop.    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 Monitor blood glucose while taking (discuss with primary care).    Acute hearing loss of right ear       * predniSONE 5 MG tablet    DELTASONE    12 tablet    Starting Tuesday 7-4-17 take 30 mg (3 Tabs) for 3 days , then take 15 mg (1.5 Tabs) for three days then discontinue    Mixed hearing loss, bilateral       TAMIFLU 75 MG capsule   Generic drug:  oseltamivir     10    1 CAPSULE DAILY x 10 days    Communic dis contact NEC       tamsulosin 0.4 MG capsule    FLOMAX     as needed         zolpidem 10 MG tablet    AMBIEN     1 tablet At Bedtime        * Notice:  This list has 6 medication(s) that are the same as other medications prescribed for you. Read the directions carefully, and ask your doctor or other care provider to review them with you.

## 2017-07-07 NOTE — PATIENT INSTRUCTIONS
Please call our clinic for any questions,concerns,or worsening symptoms.      Clinic #[phone]       Option 3  for the triage nurse.

## 2017-07-07 NOTE — LETTER
"7/7/2017       RE: Andi Parrish  [address]  [facility] [address]     Dear Colleague,    Thank you for referring your patient, Andi Parrish, to the [facility] EAR NOSE AND THROAT at [facility]. Please see a copy of my visit note below.    Andi Parrish is seen following sudden onset right hearing loss 3 weeks ago. He was started on a 10 day course of oral steroids which he has since completed. He reports improved hearing and audiogram today confirms this.     While on his course of steroids, Mr. Parrish reports diarrhea and \"not feeling himself\". Additionally he has reported increased fatigue. He has been treated with lyrica for neuropathic facial pain, which his PCP would like to wean. He is wondering if he should continue this medication until the steroid associated symptoms imporve.    Audiogram:  Left- normal through 4k, sloping to SNHL. Right: normal through 2k sloping to moderate SNHL, significant improvement 250-2000 Hz    Physical examination:  male in no acute distress.  Alert and answering questions appropriately.  HB 1/6 bilaterally.    Assessment and plan:  Mr. Parrish presents after sudden onset SNHL treated with a course of high dose steroids. He reports symptomatic improvement and audiogram today confirms this. It is likely that his systemic symptoms are a side effect of the steroids. He has been consoled that these symptoms should improve. We have advised him to wait to taper his Lyrica until after these symptoms have abated. He will follow up in clinic as needed.      I, Reinaldo Armstrong MD, saw this patient with the resident and agree with the resident s findings and plan of care as documented in the resident s note. I personally reviewed the medications, labs and imaging.      Again, thank you for allowing me to participate in the care of your patient.      Sincerely,    Reinaldo Armstrong MD      "

## 2017-07-07 NOTE — TELEPHONE ENCOUNTER
Message was left with Darleen the  at [facility] that was referenced in email.  I left pt information for Darleen to call or she can call me(information also left)  Darla Severin-Brown, LPN

## 2017-07-07 NOTE — PROGRESS NOTES
AUDIOLOGY REPORT    SUMMARY: Audiology visit completed. See audiogram for results.      RECOMMENDATIONS: Follow-up with ENT.    Lakshmi Sullivan.  Licensed Audiologist  [place] #[id number]

## 2017-07-07 NOTE — NURSING NOTE
Chief Complaint   Patient presents with     RECHECK     hearing loss     Jenelle Cramer Medical Assistant

## 2017-07-07 NOTE — PROGRESS NOTES
"Andi Parrish is seen following sudden onset right hearing loss 3 weeks ago. He was started on a 10 day course of oral steroids which he has since completed. He reports improved hearing and audiogram today confirms this.     While on his course of steroids, Mr. Parrish reports diarrhea and \"not feeling himself\". Additionally he has reported increased fatigue. He has been treated with lyrica for neuropathic facial pain, which his PCP would like to wean. He is wondering if he should continue this medication until the steroid associated symptoms imporve.    Audiogram:  Left- normal through 4k, sloping to SNHL. Right: normal through 2k sloping to moderate SNHL, significant improvement 250-2000 Hz    Physical examination:  male in no acute distress.  Alert and answering questions appropriately.  HB 1/6 bilaterally.    Assessment and plan:  Mr. Parrish presents after sudden onset SNHL treated with a course of high dose steroids. He reports symptomatic improvement and audiogram today confirms this. It is likely that his systemic symptoms are a side effect of the steroids. He has been consoled that these symptoms should improve. We have advised him to wait to taper his Lyrica until after these symptoms have abated. He will follow up in clinic as needed.      I, Reinaldo Armstrong MD, saw this patient with the resident and agree with the resident s findings and plan of care as documented in the resident s note. I personally reviewed the medications, labs and imaging.    "

## 2017-07-07 NOTE — MR AVS SNAPSHOT
After Visit Summary   7/7/2017    Andi Parrish    MRN: [id number]           Patient Information     Date Of Birth          1940        Visit Information        Provider Department      7/7/2017 11:00 AM Yulia Bhakta AuD [facility] Audiology        Today's Diagnoses     Sensorineural hearing loss (SNHL) of right ear with unrestricted hearing of left ear    -  1       Follow-ups after your visit        Your next 10 appointments already scheduled     Jul 07, 2017 11:45 AM CDT   (Arrive by 11:30 AM)   Return Visit with Reinaldo Armstrong MD   [facility] Ear Nose and Throat ([facility])    [address]  [address] [facility] [address] [phone]            Aug 08, 2017  9:00 AM CDT   Return Visit with Jeanmarie Humphrey MD   [facility] ([facility])    [address] [place] [address] [phone]              Who to contact     Please call your clinic at [phone] to:    Ask questions about your health    Make or cancel appointments    Discuss your medicines    Learn about your test results    Speak to your doctor   If you have compliments or concerns about an experience at your clinic, or if you wish to file a complaint, please contact [facility] Patient Relations at [phone] or email us at [email or web address]         Additional Information About Your Visit        MyChart Information     [employer] is an electronic gateway that provides easy, online access to your medical records. With [employer], you can request a clinic appointment, read your test results, renew a prescription or communicate with your care team.     To sign up for [employer] visit the website at [email or web address]   You will be asked to enter the access code listed below, as well as some personal information. Please follow the directions to create your username and password.     Your access code is:  [email or web address]  Expires: 2017  5:21 PM     Your access code will  in 90 days. If you need help or a new code, please contact your [facility] or call [phone] for assistance.        Care EveryWhere ID     This is your Care EveryWhere ID. This could be used by other organizations to access your [place] medical records  [id number]         Blood Pressure from Last 3 Encounters:   17 128/67   08 102/58    Weight from Last 3 Encounters:   17 77.1 kg (170 lb)   17 77.1 kg (170 lb)   17 78.1 kg (172 lb 1.6 oz)              We Performed the Following     AUDIOGRAM/TYMPANOGRAM - INTERFACE     AUDIOLOGY ADULT REFERRAL     [facility] ([address])     Tymps / Reflex   ([id number])        Primary Care Provider Office Phone # Fax #    Husam Almaguer [phone]       [facility] [address] [address]        Equal Access to Services     [facility]: Hadii aad ku hadasho Soomaali, waaxda luqadaha, qaybta kaalmada adeegyada, waxay idiin hayaan adeeg kharaty lasheldonn . So [facility] [phone].    ATENCIÓN: Si habla español, tiene a romero disposición servicios gratuitos de asistencia lingüística. Llame al [phone].    We comply with applicable federal civil rights laws and [place] laws. We do not discriminate on the basis of race, color, national origin, age, disability sex, sexual orientation or gender identity.            Thank you!     Thank you for choosing [facility] AUDIOLOGY  for your care. Our goal is always to provide you with excellent care. Hearing back from our patients is one way we can continue to improve our services. Please take a few minutes to complete the written survey that you may receive in the mail after your visit with us. Thank you!             Your Updated Medication List - Protect others around you: Learn how to safely use, store and throw away your medicines at  www.disposemymeds.org.          This list is accurate as of: 7/7/17 11:28 AM.  Always use your most recent med list.                   Brand Name Dispense Instructions for use Diagnosis    * ASPIRIN 81 MG Cpep      1 CAPSULE DAILY        * aspirin 81 MG EC tablet      Take by mouth daily        clopidogrel 75 MG tablet    PLAVIX     Take 1 tablet by mouth daily        CRESTOR PO      1 TABLET DAILY        doxycycline 100 MG tablet    VIBRA-TABS          doxycycline Monohydrate 100 MG Caps      as needed        DULoxetine 30 MG EC capsule    CYMBALTA    60 capsule    Take 1 capsule every morning for 5 days, then two capsules every morning.    Neuropathic pain       fluvastatin 20 MG capsule    LESCOL     1 capsule daily        lisinopril 10 MG tablet    PRINIVIL/ZESTRIL     Take 1 tablet by mouth daily        * LYRICA 100 MG capsule   Generic drug:  pregabalin      1 capful 3 times daily        * pregabalin 50 MG capsule    LYRICA    25 capsule    Take 1 capsule 3 times a day for 5 days, then 1 capsule 2 times a day for 3 days, then stop.    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 Monitor blood glucose while taking (discuss with primary care).    Acute hearing loss of right ear       * predniSONE 5 MG tablet    DELTASONE    12 tablet    Starting Tuesday 7-4-17 take 30 mg (3 Tabs) for 3 days , then take 15 mg (1.5 Tabs) for three days then discontinue    Mixed hearing loss, bilateral       TAMIFLU 75 MG capsule   Generic drug:   oseltamivir     10    1 CAPSULE DAILY x 10 days    Communic dis contact NEC       tamsulosin 0.4 MG capsule    FLOMAX     as needed        zolpidem 10 MG tablet    AMBIEN     1 tablet At Bedtime        * Notice:  This list has 6 medication(s) that are the same as other medications prescribed for you. Read the directions carefully, and ask your doctor or other care provider to review them with you.

## 2017-08-02 ENCOUNTER — TELEPHONE (OUTPATIENT)
Dept: NEUROLOGY | Facility: CLINIC | Age: 77
End: 2017-08-02

## 2017-08-02 NOTE — TELEPHONE ENCOUNTER
I called patient and we reschedule to 10/24/17 next avail after pt sees Dr. Contreras in [facility].  Pt also wanted Dr. Humphrey to be informed that he weaned of Lyrica and tried Duloxetine but was having chronic diarrhea. He has since went back on the lyrica.    Darla Severin-Brown, LPN

## 2017-08-02 NOTE — TELEPHONE ENCOUNTER
[facility] Call Center    Phone Message    Name of Caller: Andi    Phone Number: Cell number on file:    Telephone Information:   Mobile [phone]       Best time to return call: Any    May a detailed message be left on voicemail: yes    Relation to patient: Self    Reason for Call: Other: Patient is calling to discuss appt with Dr. Humphrey on 8/8. He was under the impression that he was coming in to go over results from his visit with  with Dr. Garcia from the [facility]. He can not get in to see the dental doctor at the [facility] until 9/26. He would to know if he still needs to come in on 8/8 to see Dr. Humphrey or if he should reschedule the appt. Please advise.     Action Taken: Message routed to:  Adult Clinics: Neurology p [phone]

## 2017-09-26 ENCOUNTER — TRANSFERRED RECORDS (OUTPATIENT)
Dept: HEALTH INFORMATION MANAGEMENT | Facility: CLINIC | Age: 77
End: 2017-09-26

## 2017-10-24 ENCOUNTER — OFFICE VISIT (OUTPATIENT)
Dept: NEUROLOGY | Facility: CLINIC | Age: 77
End: 2017-10-24
Payer: MEDICARE

## 2017-10-24 VITALS
HEART RATE: 59 BPM | OXYGEN SATURATION: 95 % | WEIGHT: 175.6 LBS | DIASTOLIC BLOOD PRESSURE: 63 MMHG | BODY MASS INDEX: 29.26 KG/M2 | SYSTOLIC BLOOD PRESSURE: 129 MMHG | HEIGHT: 65 IN

## 2017-10-24 DIAGNOSIS — K13.79 ORAL PAIN: ICD-10-CM

## 2017-10-24 DIAGNOSIS — R53.83 OTHER FATIGUE: ICD-10-CM

## 2017-10-24 DIAGNOSIS — R63.30 FEEDING DIFFICULTIES: ICD-10-CM

## 2017-10-24 DIAGNOSIS — R68.89 EXERCISE INTOLERANCE: Primary | ICD-10-CM

## 2017-10-24 LAB
BASOPHILS # BLD AUTO: 0 10E9/L (ref 0–0.2)
BASOPHILS NFR BLD AUTO: 0.3 %
CK SERPL-CCNC: 66 U/L (ref 30–300)
DIFFERENTIAL METHOD BLD: NORMAL
EOSINOPHIL # BLD AUTO: 0.1 10E9/L (ref 0–0.7)
EOSINOPHIL NFR BLD AUTO: 1 %
ERYTHROCYTE [DISTWIDTH] IN BLOOD BY AUTOMATED COUNT: 13 % (ref 10–15)
FOLATE SERPL-MCNC: 17.8 NG/ML
GLUCOSE SERPL-MCNC: 121 MG/DL (ref 70–99)
HCT VFR BLD AUTO: 45.2 % (ref 40–53)
HGB BLD-MCNC: 15.6 G/DL (ref 13.3–17.7)
LYMPHOCYTES # BLD AUTO: 1.7 10E9/L (ref 0.8–5.3)
LYMPHOCYTES NFR BLD AUTO: 29.8 %
MAGNESIUM SERPL-MCNC: 2.2 MG/DL (ref 1.6–2.3)
MCH RBC QN AUTO: 30.7 PG (ref 26.5–33)
MCHC RBC AUTO-ENTMCNC: 34.5 G/DL (ref 31.5–36.5)
MCV RBC AUTO: 89 FL (ref 78–100)
MONOCYTES # BLD AUTO: 0.5 10E9/L (ref 0–1.3)
MONOCYTES NFR BLD AUTO: 8.1 %
NEUTROPHILS # BLD AUTO: 3.5 10E9/L (ref 1.6–8.3)
NEUTROPHILS NFR BLD AUTO: 60.8 %
PLATELET # BLD AUTO: 167 10E9/L (ref 150–450)
RBC # BLD AUTO: 5.08 10E12/L (ref 4.4–5.9)
TSH SERPL DL<=0.005 MIU/L-ACNC: 3.65 MU/L (ref 0.4–4)
VIT B12 SERPL-MCNC: 319 PG/ML (ref 193–986)
WBC # BLD AUTO: 5.8 10E9/L (ref 4–11)

## 2017-10-24 PROCEDURE — 83516 IMMUNOASSAY NONANTIBODY: CPT | Mod: 90 | Performed by: PSYCHIATRY & NEUROLOGY

## 2017-10-24 PROCEDURE — 86255 FLUORESCENT ANTIBODY SCREEN: CPT | Mod: 90 | Performed by: PSYCHIATRY & NEUROLOGY

## 2017-10-24 PROCEDURE — 82947 ASSAY GLUCOSE BLOOD QUANT: CPT | Performed by: PSYCHIATRY & NEUROLOGY

## 2017-10-24 PROCEDURE — 99214 OFFICE O/P EST MOD 30 MIN: CPT | Performed by: PSYCHIATRY & NEUROLOGY

## 2017-10-24 PROCEDURE — 36415 COLL VENOUS BLD VENIPUNCTURE: CPT | Performed by: PSYCHIATRY & NEUROLOGY

## 2017-10-24 PROCEDURE — 83519 RIA NONANTIBODY: CPT | Mod: 90 | Performed by: PSYCHIATRY & NEUROLOGY

## 2017-10-24 PROCEDURE — 82607 VITAMIN B-12: CPT | Performed by: PSYCHIATRY & NEUROLOGY

## 2017-10-24 PROCEDURE — 82550 ASSAY OF CK (CPK): CPT | Performed by: PSYCHIATRY & NEUROLOGY

## 2017-10-24 PROCEDURE — 83735 ASSAY OF MAGNESIUM: CPT | Performed by: PSYCHIATRY & NEUROLOGY

## 2017-10-24 PROCEDURE — 84630 ASSAY OF ZINC: CPT | Mod: 90 | Performed by: PSYCHIATRY & NEUROLOGY

## 2017-10-24 PROCEDURE — 83921 ORGANIC ACID SINGLE QUANT: CPT | Mod: 90 | Performed by: PSYCHIATRY & NEUROLOGY

## 2017-10-24 PROCEDURE — 84425 ASSAY OF VITAMIN B-1: CPT | Mod: 90 | Performed by: PSYCHIATRY & NEUROLOGY

## 2017-10-24 PROCEDURE — 84252 ASSAY OF VITAMIN B-2: CPT | Mod: 90 | Performed by: PSYCHIATRY & NEUROLOGY

## 2017-10-24 PROCEDURE — 85025 COMPLETE CBC W/AUTO DIFF WBC: CPT | Performed by: PSYCHIATRY & NEUROLOGY

## 2017-10-24 PROCEDURE — 99000 SPECIMEN HANDLING OFFICE-LAB: CPT | Performed by: PSYCHIATRY & NEUROLOGY

## 2017-10-24 PROCEDURE — 84443 ASSAY THYROID STIM HORMONE: CPT | Performed by: PSYCHIATRY & NEUROLOGY

## 2017-10-24 PROCEDURE — 82746 ASSAY OF FOLIC ACID SERUM: CPT | Performed by: PSYCHIATRY & NEUROLOGY

## 2017-10-24 RX ORDER — CLONAZEPAM 1 MG/1
1 TABLET ORAL 4 TIMES DAILY
COMMUNITY
End: 2021-01-07

## 2017-10-24 ASSESSMENT — PAIN SCALES - GENERAL: PAINLEVEL: NO PAIN (0)

## 2017-10-24 NOTE — PATIENT INSTRUCTIONS
Thank you for choosing [facility] in [place]. It was a pleasure to see you for your office visit today.     The following is a summary of your office visit:    Please call cardiology for a follow up appointment as discussed    Let us know if you would like a referral placed for Physical Therapy with Angelina Ceron    Discuss with your primary care provider exorcising as tolerated.     We will obtain records from Dr. Contreras office for review     Follow up is as needed.     Nurse/clinic contact information: [facility] [phone]    Further instructions for your care: Call if your symptoms change or worsen. Stop at the lab on your way out today, we will be in touch with you regarding the results.

## 2017-10-24 NOTE — PROGRESS NOTES
2017            Husam Almaguer MD   [facility]   [address]   [place]  [address]       RE:  Andi Parrish   MRN:  [id number]   :  1940      Dear Dr. Almaguer:      I saw Andi Parrish in followup at the [facility] where I saw him previously for evaluation of several neurological symptoms.  Mr. Parrish reports that he saw Dr. Contreras, who prescribed clonazepam swish and swallow.  This provided meaningful benefit for his oral pain, but did cause excessive drowsiness.  He will discuss this further with Dr. Cnotreras.  He also reports heaviness in the legs when exercising on the treadmill.  He indicates that he recently saw his vascular specialist, who sees no evidence of active arterial insufficiency.  Mr. Parrish does report some low back discomfort, but does not feel that this reflects spinal claudication.  The fatigue with exercise is not associated with chest discomfort.  He does still have occasional episodes of chest discomfort lasting for days which was evaluated 1 year ago by his cardiologist.  This has not changed in character, nor does he have palpitations or syncope.  He also reports occasional slurring of speech after speaking for long periods of time.  As previously noted, he denied dysphagia at his initial visit.  He denies diplopia.  He has some ptosis related to a laxity of the lids without compelling evidence of true ptosis.  He also reports variable gait imbalance.      I performed a limited examination today.  Mr. Parrish's vital signs were normal except for a resting heart rate of 45.  He has longstanding bradycardia running from 50 to the low 60s.  We had him exercise and his pulse went from 49 to 59.  Again, he denies any new cardiac symptoms.  I discussed this with his cardiologist, who recommended consideration of a treadmill exercise test to see if he has appropriate chronotrope.  We ultimately decided  "that he should see his cardiologist in followup.      A directed neurologic examination demonstrates an alert and cooperative gentleman.  There was no dysarthria.  Extraocular movements are full.  There was no true ptosis.  Cranial examination demonstrates full strength of cranial muscles and pterygoid.  Tongue bulk, coordination and strength are normal.  There are no tongue fasciculations.  He walks independently with an appropriate base.  Romberg sign is not present.  He has modest difficulty with tandem gait which is not beyond the physiologic range.      I had an extensive conversation with Mr. Parrish and his wife lasting greater than 1 hour regarding his symptoms and medical concerns.  I reinforced the value of followup with Cardiology, discussing exercise intolerance with you in the primary care setting and followup with Podiatry for his foot pain.  We discussed the option of lumbar imaging to evaluate for the possibility of lumbar spinal stenosis with pseudoclaudication.  He prefers to defer this.  He indicates that a recent visit with Dr. Cruz demonstrated no reason for concern about arterial insufficiency, although I do see that his ABIs were 0.43 and 0.47.  It is not clear to me whether this may in fact be sufficient to explain exercise intolerance.  In fact, Dr. Cruz's note indicates that he has claudication in the right lower extremity which he feels is \"not lifestyle limiting\" and may in fact be that I am hearing it somewhat differently on today's visit.      Finally, we extended his laboratory studies to include those recommended by Dr. Contreras as well as some others that might be relevant in the context of exercise intolerance and excessive fatigue, particularly with bradycardia.  These results will be forwarded to the patient and will be available via Care Everywhere in the [place] medical record.         Sincerely,      MELE MCCARTY MD             D: 10/24/2017 10:45   T: 10/24/2017 11:26   " MT: TS      Name:     STERLING NOVA   MRN:      [id number]        Account:      [id number]   :      1940      Document: [id number]       cc: Husam Almaguer MD

## 2017-10-24 NOTE — NURSING NOTE
"Andi Parrish's goals for this visit include: return  He requests these members of his care team be copied on today's visit information:     PCP: Husam Almaguer    Referring Provider:  No referring provider defined for this encounter.    Chief Complaint   Patient presents with     RECHECK       Initial /63  Pulse (!) 45  Ht 1.651 m (5' 5\")  Wt 79.7 kg (175 lb 9.6 oz)  SpO2 95%  BMI 29.22 kg/m2 Estimated body mass index is 29.22 kg/(m^2) as calculated from the following:    Height as of this encounter: 1.651 m (5' 5\").    Weight as of this encounter: 79.7 kg (175 lb 9.6 oz).  Medication Reconciliation: complete    Do you need any medication refills at today's visit? ?  "

## 2017-10-24 NOTE — LETTER
Mr.Robert PAUL Parrish  [address] [place] [id number]        November 2, 2017    Dear ,    We are writing to inform you of your test results.    Resulted Orders   TSH with free T4 reflex   Result Value Ref Range    TSH 3.65 0.40 - 4.00 mU/L   Glucose   Result Value Ref Range    Glucose 121 (H) 70 - 99 mg/dL      Comment:      Non Fasting   Vitamin B12   Result Value Ref Range    Vitamin B12 319 193 - 986 pg/mL   Zinc   Result Value Ref Range    Zinc 75 60 - 120 ug/dL      Comment:      (Note)  INTERPRETIVE INFORMATION: Zinc, Serum or Plasma  Circulating zinc concentrations are dependent on albumin   status and are depressed with malnutrition. Zinc may also   be lowered with infection, inflammation, stress, oral   contraceptives, and pregnancy. Zinc may be elevated with   zinc supplementation or fasting. Elevated zinc   concentrations may interfere with copper absorption.  Test developed and characteristics determined by [facility]. See Compliance Statement B: [employer]/  Performed by [facility],  [address] [place] [address] [phone]  www.[employer], Oj Castillo MD, Lab. Director     Folate   Result Value Ref Range    Folate 17.8 >5.4 ng/mL   CBC with platelets and differential   Result Value Ref Range    WBC 5.8 4.0 - 11.0 10e9/L    RBC Count 5.08 4.4 - 5.9 10e12/L    Hemoglobin 15.6 13.3 - 17.7 g/dL    Hematocrit 45.2 40.0 - 53.0 %    MCV 89 78 - 100 fl    MCH 30.7 26.5 - 33.0 pg    MCHC 34.5 31.5 - 36.5 g/dL    RDW 13.0 10.0 - 15.0 %    Platelet Count 167 150 - 450 10e9/L    Diff Method Automated Method     % Neutrophils 60.8 %    % Lymphocytes 29.8 %    % Monocytes 8.1 %    % Eosinophils 1.0 %    % Basophils 0.3 %    Absolute Neutrophil 3.5 1.6 - 8.3 10e9/L    Absolute Lymphocytes 1.7 0.8 - 5.3 10e9/L    Absolute Monocytes 0.5 0.0 - 1.3 10e9/L    Absolute Eosinophils 0.1 0.0 - 0.7 10e9/L    Absolute Basophils 0.0 0.0 - 0.2 10e9/L   Magnesium   Result  Value Ref Range    Magnesium 2.2 1.6 - 2.3 mg/dL   Vitamin B1 plasma   Result Value Ref Range    Vitamin B1 3 (L) 4 - 15 nmol/L      Comment:      (Note)  INTERPRETIVE DATA: Vitamin B1, Plasma  Thiamine (vitamin B1) is reported. However, thiamine   diphosphate (TDP), the biologically active form of   thiamine, is not found in measurable concentrations in   plasma, and is best determined in whole blood specimens.   Plasma thiamine concentration reflects recent intake rather   than body stores.  Test developed and characteristics determined by [facility]. See Compliance Statement B: [employer]  Performed by [facility],  [address] [place] [address] [phone]  www.[employer], Oj Castillo MD, Lab. Director     Vitamin B2   Result Value Ref Range    Vitamin B2 6 1 - 19 mcg/L      Comment:      (Note)  This test was developed and its performance characteristics   determined by [facility] in a manner consistent with CLIA   requirements. This test has not been cleared or approved by   the U.S. Food and Drug Administration.  Test Performed by:  [facility]  [address] [place] [address]     Methylmalonic acid   Result Value Ref Range    Methylmalonic Acid 0.23 0.00 - 0.40 umol/L      Comment:      (Note)  INTERPRETIVE INFORMATION: MMA Serum/Plasma,                            Vitamin B12 Status  Test developed and characteristics determined by [facility]. See Compliance Statement B: [employer]  Performed by [facility],  [address] [place] [address] [phone]  www.[employer], Oj Castillo MD, Lab. Director     ACETYLCHOLINE RECEPTOR BINDING   Result Value Ref Range    AcetChol Binding Celeste 0.0 0.0 - 0.4 nmol/L      Comment:      (Note)  INTERPRETIVE INFORMATION: Acetylcholine Binding Ab   Negative ....... 0.0 - 0.4 nmol/L   Positive ....... 0.5 nmol/L or greater  Approximately 85-90 percent of patients with myasthenia    gravis (MG) express antibodies to the acetylcholine   receptor (AChR), which can be divided into binding,   blocking, and modulating antibodies. Binding antibody can   activate complement and lead to loss of AChR. Blocking   antibody may impair binding of acetylcholine to the   receptor, leading to poor muscle contraction. Modulating   antibody causes receptor endocytosis resulting in loss of   AChR expression, which correlates most closely with   clinical severity of disease. Approximately 10-15 percent   of individuals with confirmed myasthenia gravis have no   measurable binding, blocking, or modulating antibodies.  Test developed and characteristics determined by [facility]. See Compliance Statement B: [facility]  Performed by [facility],  [address] [place] [address] [phone]  www.[employer], Oj Castillo MD, Lab. Director     STRIATED MUSCLE ANTIBODY IGG   Result Value Ref Range    Striated Muscle Antibody IgG <1:40 <1:40      Comment:      (Note)  Striated Muscle Antibodies, IgG are not detected. No   further testing will be performed.  INTERPRETIVE DATA:  Striated Muscle Antibodies, IgG Screen  In the presence of acetylcholine receptor (AChR) antibody,   striated muscle antibodies, which bind in a   cross-striational pattern to skeletal and heart muscle   tissue sections, are associated with late-onset myasthenia   gravis (MG). Striated muscle antibodies recognize epitopes   on three major muscle proteins, including: titin, ryanodine   receptor (RyR) and Kv1.4 (an alpha subunit of voltage-gated   potassium channel [VGKC]). Isolated cases of striated   muscle antibodies may be seen in patients with certain   autoimmune diseases, rheumatic fever, myocardial   infarction, and following some cardiotomy procedures.  Test developed and characteristics determined by [facility]. See Compliance Statement A: [facility]  Performed by [facility],  [address] [facility] [place] [address] [phone]  www.[employer], Kayli Castillo MD, Lab. Director     ACETYLCHOLINE MODULATING ANTIBODY   Result Value Ref Range    AcetChol Modul Giorgi 0 <=45 %      Comment:      (Note)  INTERPRETIVE INFORMATION: Acetylcholine Modulating Ab   Negative ..........  0-45 percent modulating   Positive ..........  46 percent or greater modulating  Approximately 85-90 percent of patients with myasthenia   gravis (MG) express antibodies to the acetylcholine   receptor (AChR), which can be divided into binding,   blocking, and modulating antibodies. Binding antibody can   activate complement and lead to loss of AChR. Blocking   antibody may impair binding of acetylcholine to the   receptor, leading to poor muscle contraction. Modulating   antibody causes receptor endocytosis resulting in loss of   AChR expression, which correlates most closely with   clinical severity of disease. Approximately 10-15 percent   of individuals with confirmed myasthenia gravis have no   measurable binding, blocking, or modulating antibodies.  Test developed and characteristics determined by [facility]. See Compliance Statement B: [employer]/  Performed by [facility],  [address] [place] [address] [phone]  www.[employer], Oj Castillo MD, Lab. Director     ACETYLCHOLINE RECEPTOR BLOCKING GIORGI   Result Value Ref Range    AcetChol Block Giorgi 25 0 - 26 %      Comment:      (Note)  INTERPRETIVE INFORMATION: Acetylcholine Blocking Ab   Negative ............  0-26 percent blocking   Indeterminate ....... 27-41 percent blocking   Positive ............ 42 percent or greater blocking  Approximately 85-90 percent of patients with myasthenia   gravis (MG) express antibodies to the acetylcholine   receptor (AChR), which can be divided into binding,   blocking, and modulating antibodies. Binding antibody can   activate complement and lead to loss of AChR. Blocking   antibody may impair binding of acetylcholine to the    receptor, leading to poor muscle contraction. Modulating   antibody causes receptor endocytosis resulting in loss of   AChR expression, which correlates most closely with   clinical severity of disease. Approximately 10-15 percent   of individuals with confirmed myasthenia gravis have no   measurable binding, blocking, or modulating antibodies.  Test developed and characteristics determined by [facility]. See Compliance Statement B: [employer]  Performed by [facility],  [address] [place] [address] [phone]  www.[employer], Oj Castillo MD, Lab. Director     CK total   Result Value Ref Range    CK Total 66 30 - 300 U/L         All labs negative except for vitamin B1, with a finding of uncertain significance. I ordered this at the recommendation of another provider-([facility]reras in the [facility] [employer]).  We are forwarding the results to Dr. Anival Contreras.    Jeanmarie Humphrey MD  dsb

## 2017-10-24 NOTE — MR AVS SNAPSHOT
After Visit Summary   10/24/2017    Andi Parrish    MRN: [id number]           Patient Information     Date Of Birth          1940        Visit Information        Provider Department      10/24/2017 9:00 AM Jeanmarie Humphrey MD [facility]        Today's Diagnoses     Exercise intolerance    -  1    Oral pain        Feeding difficulties         Other fatigue        Other fatigue           Care Instructions    Thank you for choosing [facility] in [place]. It was a pleasure to see you for your office visit today.     The following is a summary of your office visit:    Please call cardiology for a follow up appointment as discussed    Let us know if you would like a referral placed for Physical Therapy with Angelina Ceron    Discuss with your primary care provider exorcising as tolerated.     We will obtain records from Dr. Contreras office for review     Follow up is as needed.     Nurse/clinic contact information: [facility] [phone]    Further instructions for your care: Call if your symptoms change or worsen. Stop at the lab on your way out today, we will be in touch with you regarding the results.             Follow-ups after your visit        Who to contact     If you have questions or need follow up information about today's clinic visit or your schedule please contact [facility] directly at [phone].  Normal or non-critical lab and imaging results will be communicated to you by MyChart, letter or phone within 4 business days after the clinic has received the results. If you do not hear from us within 7 days, please contact the clinic through MyChart or phone. If you have a critical or abnormal lab result, we will notify you by phone as soon as possible.  Submit refill requests through [employer] or call your pharmacy and they will forward the refill request to us. Please allow 3 business days for your refill to be  "completed.          Additional Information About Your Visit        [employer] Information     [employer] is an electronic gateway that provides easy, online access to your medical records. With [employer], you can request a clinic appointment, read your test results, renew a prescription or communicate with your care team.     To sign up for [employer] visit the website at [email or web address]   You will be asked to enter the access code listed below, as well as some personal information. Please follow the directions to create your username and password.     Your access code is: [email or web address]  Expires: 2018 10:36 AM     Your access code will  in 90 days. If you need help or a new code, please contact your [facility] or call [phone] for assistance.        Care EveryWhere ID     This is your Care EveryWhere ID. This could be used by other organizations to access your [place] medical records  [id number]        Your Vitals Were     Pulse Height Pulse Oximetry BMI (Body Mass Index)          59 5' 5\" (1.651 m) 95% 29.22 kg/m2         Blood Pressure from Last 3 Encounters:   10/24/17 129/63   17 128/67   08 102/58    Weight from Last 3 Encounters:   10/24/17 175 lb 9.6 oz (79.7 kg)   17 167 lb (75.8 kg)   17 170 lb (77.1 kg)              We Performed the Following     CBC with platelets and differential     Folate     Glucose     Magnesium     Methylmalonic acid     TSH with free T4 reflex     Vitamin B1 plasma     Vitamin B12     Vitamin B2     Zinc          Today's Medication Changes          These changes are accurate as of: 10/24/17 10:39 AM.  If you have any questions, ask your nurse or doctor.               These medicines have changed or have updated prescriptions.        Dose/Directions    pregabalin 50 MG capsule   Commonly known as:  LYRICA   This may have changed:    - how much to take  - how to take this  - when to take this  - additional " instructions   Used for:  Acute hearing loss of right ear        Take 1 capsule 3 times a day for 5 days, then 1 capsule 2 times a day for 3 days, then stop.   Quantity:  25 capsule   Refills:  1                Primary Care Provider Office Phone # Fax #    Husam Almaguer [phone]       [facility] [address] [address]        Equal Access to Services     [facility]: Hadii aad ku hadasho Soomaali, waaxda luqadaha, qaybta kaalmada adeegyada, waxay idiin hayaan adeeg kharash la'aan ah. So [facility] [phone].    ATENCIÓN: Si yasminla eleni, tiene a romero disposición servicios gratuitos de asistencia lingüística. Llame al [phone].    We comply with applicable federal civil rights laws and [place] laws. We do not discriminate on the basis of race, color, national origin, age, disability, sex, sexual orientation, or gender identity.            Thank you!     Thank you for choosing [facility]  for your care. Our goal is always to provide you with excellent care. Hearing back from our patients is one way we can continue to improve our services. Please take a few minutes to complete the written survey that you may receive in the mail after your visit with us. Thank you!             Your Updated Medication List - Protect others around you: Learn how to safely use, store and throw away your medicines at www.disposemymeds.org.          This list is accurate as of: 10/24/17 10:39 AM.  Always use your most recent med list.                   Brand Name Dispense Instructions for use Diagnosis    aspirin 81 MG EC tablet      Take by mouth daily        clonazePAM 1 MG tablet    klonoPIN     Take 1 mg by mouth 4 times daily 1 tablet Swish and spit        clopidogrel 75 MG tablet    PLAVIX     Take 1 tablet by mouth daily        doxycycline monohydrate 100 MG capsule      as needed        DULoxetine 30 MG EC capsule    CYMBALTA    60 capsule    Take 1 capsule every  morning for 5 days, then two capsules every morning.    Neuropathic pain       fluvastatin 20 MG capsule    LESCOL     1 capsule daily        lisinopril 10 MG tablet    PRINIVIL/ZESTRIL     Take 1 tablet by mouth daily        metroNIDAZOLE 0.75 % cream    METROCREAM     as needed        nitroGLYcerin 0.4 MG sublingual tablet    NITROSTAT     Place 0.4 mg under the tongue as needed        pregabalin 50 MG capsule    LYRICA    25 capsule    Take 1 capsule 3 times a day for 5 days, then 1 capsule 2 times a day for 3 days, then stop.    Acute hearing loss of right ear       tamsulosin 0.4 MG capsule    FLOMAX     as needed        zolpidem 10 MG tablet    AMBIEN     1.5 tablets At Bedtime

## 2017-10-24 NOTE — LETTER
10/24/2017       RE: Andi Parrish  [address] [facility] [address]     Dear Colleague,    Thank you for referring your patient, Andi Parrish, to the [facility]. Please see a copy of my visit note below.    [id number]    2017            Husam Almaguer MD   [facility]   [address] [place] [address]. [address]   [place]  [address]       RE:  Andi Parrish   MRN:  [id number]   :  1940      Dear Dr. Almaguer:      I saw Andi Parrish in followup at the [facility] where I saw him previously for evaluation of several neurological symptoms.  Mr. Parrish reports that he saw Dr. Contreras, who prescribed clonazepam swish and swallow.  This provided meaningful benefit for his oral pain, but did cause excessive drowsiness.  He will discuss this further with Dr. Contreras.  He also reports heaviness in the legs when exercising on the treadmill.  He indicates that he recently saw his vascular specialist, who sees no evidence of active arterial insufficiency.  Mr. Parrish does report some low back discomfort, but does not feel that this reflects spinal claudication.  The fatigue with exercise is not associated with chest discomfort.  He does still have occasional episodes of chest discomfort lasting for days which was evaluated 1 year ago by his cardiologist.  This has not changed in character, nor does he have palpitations or syncope.  He also reports occasional slurring of speech after speaking for long periods of time.  As previously noted, he denied dysphagia at his initial visit.  He denies diplopia.  He has some ptosis related to a laxity of the lids without compelling evidence of true ptosis.  He also reports variable gait imbalance.      I performed a limited examination today.  Mr. Parrish's vital signs were normal except for a resting heart rate of 45.  He has longstanding  "bradycardia running from 50 to the low 60s.  We had him exercise and his pulse went from 49 to 59.  Again, he denies any new cardiac symptoms.  I discussed this with his cardiologist, who recommended consideration of a treadmill exercise test to see if he has appropriate chronotrope.  We ultimately decided that he should see his cardiologist in followup.      A directed neurologic examination demonstrates an alert and cooperative gentleman.  There was no dysarthria.  Extraocular movements are full.  There was no true ptosis.  Cranial examination demonstrates full strength of cranial muscles and pterygoid.  Tongue bulk, coordination and strength are normal.  There are no tongue fasciculations.  He walks independently with an appropriate base.  Romberg sign is not present.  He has modest difficulty with tandem gait which is not beyond the physiologic range.      I had an extensive conversation with Mr. Parrish and his wife lasting greater than 1 hour regarding his symptoms and medical concerns.  I reinforced the value of followup with Cardiology, discussing exercise intolerance with you in the primary care setting and followup with Podiatry for his foot pain.  We discussed the option of lumbar imaging to evaluate for the possibility of lumbar spinal stenosis with pseudoclaudication.  He prefers to defer this.  He indicates that a recent visit with Dr. Cruz demonstrated no reason for concern about arterial insufficiency, although I do see that his ABIs were 0.43 and 0.47.  It is not clear to me whether this may in fact be sufficient to explain exercise intolerance.  In fact, Dr. Cruz's note indicates that he has claudication in the right lower extremity which he feels is \"not lifestyle limiting\" and may in fact be that I am hearing it somewhat differently on today's visit.      Finally, we extended his laboratory studies to include those recommended by Dr. Contreras as well as some others that might be relevant " in the context of exercise intolerance and excessive fatigue, particularly with bradycardia.  These results will be forwarded to the patient and will be available via Care Everywhere in the [place] medical record.         Sincerely,      JEANMARIE MCCARTY MD             D: 10/24/2017 10:45   T: 10/24/2017 11:26   MT: TS      Name:     STERLING NOVA   MRN:      [id number]        Account:      [id number]   :      1940      Document: [id number]       cc: Husam Almaguer MD       Again, thank you for allowing me to participate in the care of your patient.      Sincerely,    Jeanmarie Mccarty MD

## 2017-10-26 LAB
ACHR BIND AB SER-SCNC: 0 NMOL/L (ref 0–0.4)
METHYLMALONATE SERPL-SCNC: 0.23 UMOL/L (ref 0–0.4)
STRIA MUS IGG SER QL IF: NORMAL
VIT B2 SERPL-MCNC: 6 MCG/L (ref 1–19)
ZINC SERPL-MCNC: 75 UG/DL (ref 60–120)

## 2017-10-28 LAB — ACHR MOD AB/ACHR TOTAL SFR SER: 0 %

## 2017-10-29 LAB — ACHR BLOCK AB/ACHR TOTAL SFR SER: 25 % (ref 0–26)

## 2017-10-30 LAB — VIT B1 SERPL-MCNC: 3 NMOL/L (ref 4–15)

## 2017-11-07 ENCOUNTER — TELEPHONE (OUTPATIENT)
Dept: NEUROLOGY | Facility: CLINIC | Age: 77
End: 2017-11-07

## 2017-11-07 DIAGNOSIS — R26.0 ATAXIC GAIT: Primary | ICD-10-CM

## 2017-11-07 NOTE — TELEPHONE ENCOUNTER
Contacted pt and he is now interested in the PT ref. They would like a ref placed for the [facility] if possible. They live in [place]. I infomred pt I wuld let Dr. Humphrey know and we would get back to them at some point tomorrow. Pt's wife was ok with this.  Trinity Brown, CMA

## 2017-11-07 NOTE — TELEPHONE ENCOUNTER
[facility] Call Center    Phone Message    Name of Caller: Nabil    Phone Number: Other phone number:  [phone]    Best time to return call: any    May a detailed message be left on voicemail: yes    Relation to patient: Other Name: Nabil  Relationship: Spouse      Reason for Call: Other: Nabil is calling stating that at the last visit with Dr. Humphrey that Dr. Humphrey reccomended a physical therapist but the patient forgot the name of who Dr. Humphrey reccomended. Please advise.     Action Taken: Message routed to:  Adult Clinics: Neurology p [id number]

## 2017-11-07 NOTE — TELEPHONE ENCOUNTER
Please let the pt know that Angelina Ceron is the physical therapist that Dr Humphrey was recommending. Is he wanting a referral from Dr Humphrey?   Joyce Sanchez RN

## 2017-11-09 NOTE — TELEPHONE ENCOUNTER
I signed the referral and called the patient. No answer at either number so I left a voice mail message.

## 2017-11-28 ENCOUNTER — THERAPY VISIT (OUTPATIENT)
Dept: PHYSICAL THERAPY | Facility: CLINIC | Age: 77
End: 2017-11-28

## 2017-11-28 DIAGNOSIS — R26.89 BALANCE PROBLEMS: Primary | ICD-10-CM

## 2017-11-28 DIAGNOSIS — E08.42 DIABETIC POLYNEUROPATHY ASSOCIATED WITH DIABETES MELLITUS DUE TO UNDERLYING CONDITION (H): ICD-10-CM

## 2017-11-28 DIAGNOSIS — R53.81 PHYSICAL DECONDITIONING: ICD-10-CM

## 2017-11-28 NOTE — MR AVS SNAPSHOT
After Visit Summary   2017    Andi Parrish    MRN: [id number]           Patient Information     Date Of Birth          1940        Visit Information        Provider Department      2017 10:00 AM Susanne Ceron PT [facility]        Today's Diagnoses     Balance problems    -  1    Physical deconditioning        Diabetic polyneuropathy associated with diabetes mellitus due to underlying condition (H)           Follow-ups after your visit        Your next 10 appointments already scheduled     2018 10:00 AM CST   Treatment 45 with [facility] ([facility])    [address] [place] [id number] [phone]              Who to contact     Please call your clinic at [phone] to:    Ask questions about your health    Make or cancel appointments    Discuss your medicines    Learn about your test results    Speak to your doctor   If you have compliments or concerns about an experience at your clinic, or if you wish to file a complaint, please contact [facility] Patient Relations at [phone] or email us at [email or web address]         Additional Information About Your Visit        MyChart Information     PhytoCeuticat is an electronic gateway that provides easy, online access to your medical records. With [employer], you can request a clinic appointment, read your test results, renew a prescription or communicate with your care team.     To sign up for PhytoCeuticat visit the website at [email or web address]   You will be asked to enter the access code listed below, as well as some personal information. Please follow the directions to create your username and password.     Your access code is: [email or web address]  Expires: 2018  9:36 AM     Your access code will  in 90 days. If you need help or a new code, please contact your [facility]  or call [phone] for assistance.        Care EveryWhere ID     This is your Care EveryWhere ID. This could be used by other organizations to access your [place] medical records  [id number]         Blood Pressure from Last 3 Encounters:   10/24/17 129/63   06/20/17 128/67   02/23/08 102/58    Weight from Last 3 Encounters:   10/24/17 79.7 kg (175 lb 9.6 oz)   07/07/17 75.8 kg (167 lb)   06/28/17 77.1 kg (170 lb)              Today, you had the following     No orders found for display         Today's Medication Changes          These changes are accurate as of: 11/28/17 11:59 PM.  If you have any questions, ask your nurse or doctor.               These medicines have changed or have updated prescriptions.        Dose/Directions    pregabalin 50 MG capsule   Commonly known as:  LYRICA   This may have changed:    - how much to take  - how to take this  - when to take this  - additional instructions   Used for:  Acute hearing loss of right ear        Take 1 capsule 3 times a day for 5 days, then 1 capsule 2 times a day for 3 days, then stop.   Quantity:  25 capsule   Refills:  1                Primary Care Provider Office Phone # Fax #    Husam Almaguer [phone]       [facility] [address] [place] [id number]        Equal Access to Services     PINA AMEZCUA : Hadii rina ku hadasho Soomaali, waaxda luqadaha, qaybta kaalmada adeegyada, caryn millan. So [facility] [phone].    ATENCIÓN: Si habla español, tiene a romero disposición servicios gratuitos de asistencia lingüística. Llame al [phone].    We comply with applicable federal civil rights laws and [place] laws. We do not discriminate on the basis of race, color, national origin, age, disability, sex, sexual orientation, or gender identity.            Thank you!     Thank you for choosing [facility]  for your care. Our goal is always to provide you with excellent care. Hearing back from our  patients is one way we can continue to improve our services. Please take a few minutes to complete the written survey that you may receive in the mail after your visit with us. Thank you!             Your Updated Medication List - Protect others around you: Learn how to safely use, store and throw away your medicines at www.disposemymeds.org.          This list is accurate as of: 11/28/17 11:59 PM.  Always use your most recent med list.                   Brand Name Dispense Instructions for use Diagnosis    aspirin 81 MG EC tablet      Take by mouth daily        clonazePAM 1 MG tablet    klonoPIN     Take 1 mg by mouth 4 times daily 1 tablet Swish and spit        clopidogrel 75 MG tablet    PLAVIX     Take 1 tablet by mouth daily        doxycycline monohydrate 100 MG capsule      as needed        DULoxetine 30 MG EC capsule    CYMBALTA    60 capsule    Take 1 capsule every morning for 5 days, then two capsules every morning.    Neuropathic pain       fluvastatin 20 MG capsule    LESCOL     1 capsule daily        lisinopril 10 MG tablet    PRINIVIL/ZESTRIL     Take 1 tablet by mouth daily        metroNIDAZOLE 0.75 % cream    METROCREAM     as needed        nitroGLYcerin 0.4 MG sublingual tablet    NITROSTAT     Place 0.4 mg under the tongue as needed        pregabalin 50 MG capsule    LYRICA    25 capsule    Take 1 capsule 3 times a day for 5 days, then 1 capsule 2 times a day for 3 days, then stop.    Acute hearing loss of right ear       tamsulosin 0.4 MG capsule    FLOMAX     as needed        zolpidem 10 MG tablet    AMBIEN     1.5 tablets At Bedtime

## 2017-11-29 NOTE — PROGRESS NOTES
Outpatient Physical Therapy Evaluation  PLAN OF TREATMENT FOR OUTPATIENT REHABILITATION  (COMPLETE FOR INITIAL CLAIMS ONLY)  Patient's Last Name, First Name, M.I.  YOB: 1940  Andi Parrish                        Provider's Name  Susanne Ceron Medical Record No.  [id number]                               Onset Date:  Order date 11/8/2017   Start of Care Date: 11/28/2017     Type: Physical Therapy Medical Diagnosis: ataxic gait                        Therapy Diagnosis:  Impaired balance and gait. Deconditioned or limited activity tolerance. Hx of neuropathy   Visits from SOC:  1   _________________________________________________________________________________  Plan of Treatment:      Frequency/Duration: 3 months of therapy. Need to have frequent vists and follow through on HEP.    Goals: improve gait speed/tolerance on 6 minute walk, improve gait with head motion and confidence for community mobility  _________________________________________________________________________________    I CERTIFY THE NEED FOR THESE SERVICES FURNISHED UNDER        THIS PLAN OF TREATMENT AND WHILE UNDER MY CARE     (Physician attestation of this document indicates review and certification of the therapy plan).                  Certification date from: 11/28/2017  Certification date to: 2/25/2018    Referring Provider: Dr Jeanmarie Humphrey

## 2017-11-29 NOTE — PROGRESS NOTES
11/28/17 1000       Present No   General Information   Start of Care Date 11/28/17   Referring Physician Dr Humphrey   Orders Evaluate and Treat as Indicated   Additional Orders balance and gait at the [facility]   Order Date 11/08/17   Medical Diagnosis ataxic gait   Onset of illness/injury or Date of Surgery (2014???)   Special Instructions going to [place] from Dec 12-23.  (FRANCISCO is prefererd name)   Surgical/Medical history reviewed Yes;Other (see commments)  (extensive notes: CAD/CABG, PAD, DM, neuropathy)   Pertinent history of current vestibular problem (include personal factors and/or comorbidities that impact the POC)  (right hearing loss 7/2017 treated [facility] steroids/Dr Armstrong)   Pertinent history of current problem (include personal factors and/or comorbidities that impact the POC) Long history of CV issues. What is not in my record is that I have flat feet that hurt.  My balance is off.    Pertinent Visual History  my eye sight is deteriotating   Prior level of function comment Treadmill daily for years faithfully, 15 minutes at 1.8 MPH?? with hands on the handrails, slow speed per pt. He can drive but has a  most of the time. No regular ex program. Does not go the [employer] club that he has a memebership to. Several years ago had a  and it was very unpleasant experience.    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 with no railing. He uses these steps daily to walk to the vehicle that  is in. He sometimes needs assistance on these steps from the .    Patient/Family Goals Statement to improve my balance and my walking, improve my confidence   General Information Comments burning mouth syndrome (since 2014??), The new  medication miraculously helped but the med makes him a feeel a little goofy.   Fall Risk Screen   Fall screen completed by PT   Have you fallen 2 or more times in the past year? No   Have you fallen and had an injury in the past year? No   Is patient a fall risk? Yes   Fall screen comments not fearful of falling.    Functional Scales   Functional Scales and Outcomes DHI 46/100   Pain   Patient currently in pain Yes   Pain location tongue  (if I talk alot my speech becomes impaired)   Pain rating 3  (did not take any meds today, plus a root canal yesterday)   Pain description comment Has had terrible back pain a week ago but is gone at this time. It had been a long time between episodes.    Additional pain locations? Pain location 2   Pain location 2 feet discomfort, non in bed   Pain rating 2 2-3   Pain comments my feet hurt all the time, more when I use them.    Range of Motion (ROM)   ROM Comment CROM WFLS,    Gait   Gait Comments 25fTW at fast speed  7.32 seconds and 14 steps.Arms swinging.   Gait Special Tests 25 Foot Timed Walk   Seconds 9   Steps 16 Steps   Comments normal GLYNN, symmetrical steps, arms swing a bit. Gaze is at the ground the whole time   Gait Special Tests Functional Gait Assessment Score out of 30   Score out of 30 20   Balance Special Tests Modified CTSIB Conditions   Condition 1, seconds 30 Seconds   Condition 2, seconds 30 Seconds   Condition 4, seconds 30 Seconds   Condition 5, seconds 15 Seconds  (uncomfortable)   Balance Special Tests Sit to Stand Reps in 30 Seconds   Reps in 30 seconds 14   Height 18   Comments legs can feel that they worked. He had good control.   Sensory Examination   Sensory Perception other (describe)   Sensory Perception Comments left leg numbness goes up to mid thigh. Right leg is nto the same phenomenom: no numbness   Clinical Impression   Criteria for Skilled Therapeutic Interventions Met yes, treatment indicated   PT Diagnosis balance problem and deconditioned    Influenced by the following impairments sensation in feet/left leg, mouth pain, bilateral foot pain, dynamic postural control, and limited activity tolerance   Functional limitations due to impairments affects IADLS/ADLs, household/community mobility, and recreational activities   Clinical Presentation Stable/Uncomplicated   Clinical Presentation Rationale complex medical history, subjective info/DHI, gait speed/quality, FGA, sit to stand test, and clinical judgement   Clinical Decision Making (Complexity) Low complexity   Therapy Frequency other (see comments)  (wait until he returns from [place], then every 1-2 weeks.)   Predicted Duration of Therapy Intervention (days/wks) 3 months or more. waiting one month before next appt   Risk & Benefits of therapy have been explained Yes   Patient, Family & other staff in agreement with plan of care Yes   Clinical Impression Comments His ex routine on the treadmill is very low level (1.8 MPH for 15 minutes holding handrails). He needs to increase his activity level to improve his health. He is at risk of falls and has decreased confidence thus balance therapy is very appropriate. The key to this will be his abiility to follow through with his HEP. or his activity tolerance will not improve.   Goal 1   Goal Identifier 6 minute walk   Goal Description He will be able to do a 6 minute walk at .9 m/s or greater consistently for community mobility   Target Date 02/28/18   Goal 2   Goal Identifier gait with head motion   Goal Description He will be sharon to amb with horizontal and vertical head motion with no change in gait quality or speed and feel safe.   Target Date 02/28/18   Goal 3   Goal Identifier HEP   Goal Description He will perform HEP 4 or more days a week for strenghtening, balance and CV exercise   Target Date 02/28/18   Goal 4   Goal Identifier DHI   Goal Description Improve by 10 points or more on DHI. Baseline is 46/100   Target Date 02/28/18   Total Evaluation  Time   Total Evaluation Time (Minutes) 40   Angelina Osmany DPT, MPT, NCS

## 2020-09-28 NOTE — TELEPHONE ENCOUNTER
RECORDS RECEIVED FROM: Self   Date of Appt: 10/22/20   NOTES (FOR ALL VISITS) STATUS DETAILS   OFFICE NOTE from referring provider N/A    OFFICE NOTE from other specialist Internal Dr Humphrey @ [facility]:  10/24/17   DISCHARGE SUMMARY from hospital N/A    DISCHARGE REPORT from the ER N/A    OPERATIVE REPORT N/A    MEDICATION LIST Internal    IMAGING  (FOR ALL VISITS)     EMG N/A    EEG N/A    LUMBAR PUNCTURE N/A    CALI SCAN N/A    DEXA SCAN *NEUROSURGERY* N/A    ULTRASOUND (CAROTID BILAT) *VASCULAR* N/A    MRI (HEAD, NECK, SPINE) Internal [facility]:  MRI Brain 6/24/17   XRAY (SPINE) *NEUROSURGERY* N/A    CT (HEAD, NECK, SPINE) N/A

## 2020-10-22 ENCOUNTER — PRE VISIT (OUTPATIENT)
Dept: NEUROLOGY | Facility: CLINIC | Age: 80
End: 2020-10-22

## 2020-10-22 ENCOUNTER — OFFICE VISIT (OUTPATIENT)
Dept: NEUROLOGY | Facility: CLINIC | Age: 80
End: 2020-10-22
Payer: MEDICARE

## 2020-10-22 VITALS
OXYGEN SATURATION: 97 % | WEIGHT: 170 LBS | BODY MASS INDEX: 28.29 KG/M2 | DIASTOLIC BLOOD PRESSURE: 65 MMHG | HEART RATE: 55 BPM | SYSTOLIC BLOOD PRESSURE: 129 MMHG

## 2020-10-22 DIAGNOSIS — R26.9 ABNORMAL GAIT: ICD-10-CM

## 2020-10-22 DIAGNOSIS — R26.9 ABNORMAL GAIT: Primary | ICD-10-CM

## 2020-10-22 LAB
CK SERPL-CCNC: 73 U/L (ref 30–300)
VIT B12 SERPL-MCNC: 382 PG/ML (ref 193–986)

## 2020-10-22 PROCEDURE — 82607 VITAMIN B-12: CPT | Performed by: PATHOLOGY

## 2020-10-22 PROCEDURE — 99203 OFFICE O/P NEW LOW 30 MIN: CPT | Performed by: PSYCHIATRY & NEUROLOGY

## 2020-10-22 PROCEDURE — 83921 ORGANIC ACID SINGLE QUANT: CPT | Performed by: PATHOLOGY

## 2020-10-22 PROCEDURE — 84425 ASSAY OF VITAMIN B-1: CPT | Mod: 90 | Performed by: PATHOLOGY

## 2020-10-22 PROCEDURE — 36415 COLL VENOUS BLD VENIPUNCTURE: CPT | Performed by: PATHOLOGY

## 2020-10-22 PROCEDURE — 99000 SPECIMEN HANDLING OFFICE-LAB: CPT | Performed by: PATHOLOGY

## 2020-10-22 PROCEDURE — 82550 ASSAY OF CK (CPK): CPT | Performed by: PATHOLOGY

## 2020-10-22 ASSESSMENT — PAIN SCALES - GENERAL: PAINLEVEL: MODERATE PAIN (4)

## 2020-10-22 NOTE — PATIENT INSTRUCTIONS
1. MRI scans  2. Blood tests today  3. Physical therapy referral    I will review results with you when they are available. I may ask a colleague with further expertise in this area to see you if these are unremarkable.

## 2020-10-22 NOTE — LETTER
10/22/2020       RE: Andi Parrish  [address] [id number]     Dear Colleague,    Thank you for referring your patient, Andi Parrish, to the [facility] [place] [facility]. Please see a copy of my visit note below.    Service Date: 10/22/2020      DO David Gaona [facility]   [address], [facility] [address]   [place] [address]      RE: Andi Parrish   MRN: [id number]   : 1940      Dear Dr. Martin:      I saw Andi Parrish in neuromuscular consultation today for further evaluation of his symptoms in the hands and gait difficulty.  Mr. 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  He recalls seeing a physical therapist on one occasion for gait training and found that helpful, but did not follow up.  He now reports a chief complaint of difficulty walking, which has worsened in recent months or years.  Specifically, he indicates that his legs get heavy after walking about 1 block.  He denies radicular symptoms, but does have some postural low back pain.  He also reports gait imbalance that is present at all times.  He reports some numbness in his feet, which has been present for several years, but has not changed.  Mr. Parrish also indicates that he has had infrequent occasions of cramping of the right hand and one occasion of cramping in the left hand.      PAST MEDICAL HISTORY:  Notable for peripheral vascular disease.  He most recently underwent placement of a carotid stent 1-2 months ago.  He has not had a documented stroke.  He does not smoke.  He indicates he has about 2 alcoholic beverages every evening and drank slightly more prior to his stent 4-6 weeks ago.      PHYSICAL EXAMINATION:  General physical examination is notable for a healthy-appearing man.  Vital signs are normal.  Skin is intact.  There are no  disproportionate degenerative joint changes for a person of his age.  There is full range of motion at the cervical spine.       80 year old man     Review of Systems  No past medical history on file.   Social History     Socioeconomic History     Marital status:      Spouse name: None     Number of children: None     Years of education: None     Highest education level: None   Occupational History     None   Social Needs     Financial resource strain: None     Food insecurity     Worry: None     Inability: None     Transportation needs     Medical: None     Non-medical: None   Tobacco Use     Smoking status: Former Smoker     Years: 10.00     Smokeless tobacco: Never Used   Substance and Sexual Activity     Alcohol use: Yes     Drug use: None     Sexual activity: Never   Lifestyle     Physical activity     Days per week: None     Minutes per session: None     Stress: None   Relationships     Social connections     Talks on phone: None     Gets together: None     Attends Sikh service: None     Active member of club or organization: None     Attends meetings of clubs or organizations: None     Relationship status: None     Intimate partner violence     Fear of current or ex partner: None     Emotionally abused: None     Physically abused: None     Forced sexual activity: None   Other Topics Concern     None   Social History Narrative     None      No family history on file.       NEUROLOGIC EXAMINATION:  As follows:     Mental state: Alert, appropriate, speech, language, and thought content normal.     Cranial nerves II-XII normal.    Sensory examination:     Right Left   Light touch Normal Normal   Vibration (timed)     Vibration (Rydell-Seiffer) 4 4   Temp     Pin Normal Normal   Pos     Legend:   MM = medial malleolus, TT = tibial tuberosity, K = patella, MCP = MCP joint  MF = mid-foot, DC = distal calf, MC = mid calf, PC = proximal calf      Motor examination:     Right Left   Shoulder abduction  5 5    Elbow extension 5 5   Elbow flexion 5 5   Wrist extension  5 5   Finger extension 5 5   FDI 5 5   APB 5 5   Hip flexion 5 5   Knee flexion 5 5   Knee extension 5 5   Dorsiflexion 5 5   Plantar flexion 5 5   A=atrophy    Tone equivocal increase left upper and lower limbs     Reflexes:   Right Left   Biceps 2 2   BRD 2 2   Triceps 2 2   Chuy 2 2   Patellar 3 3   Achilles 2+ 2+   Plantar Flexor Flexor   Clonus Absent Absent      Coordination:  Finger-nose normal.  Heel-shin mild dysmetria, R>L  RRMs trace clumsiness UEs    Gait:  Normal base and posture. Steps a bit short and choppy. Arm swing WBNL. Modest extra steps when turning. Some difficulty toe walking, more heel walking, sandra left. Great difficulty with tandem, nearly freezes. Romberg negative. Not retropulsive.    Mr. Parrish presents with a chief complaint of a gait difficulty, as well as gait limitation in the context of known and treated peripheral arterial disease.  His gait is modestly abnormal without compelling diagnostic features.  He does have unusually brisk patellar reflexes.  Prior brain MRI scans demonstrate a signal change in the basal ganglia, which is symmetric and most likely benign and possible mild atrophy of the mid-brain.      I am obtaining a cervical and brain MRI, the former for consideration of cervical canal stenosis, given his relatively brisk reflexes and slightly slow rapid repetitive movements on the left, as well as the equivocal increase in tone on the left.  The former, the brain MRI, is not likely to be substantially different than those obtained in 2017, but I am obtaining this to look for any stigmata of certain extrapyramidal syndromes, which I think somewhat unlikely.  I am obtaining a vitamin B12 level and explained to him that with an exquisitely well-preserved vibration sense in the feet, it is unlikely that the B12 deficiency is the cause of his problem, but it would be inappropriate to overlook this possibility.   Finally, given his gait limitation, it is, of course, quite possible that he has recurrent vascular or spinal claudication; however, I am taken more by the modest abnormality of his gait at rest and patellar hyperreflexia.  I could repeat lumbar imaging at a later date and certainly you or his vascular specialist is welcome to do further vascular studies if deemed appropriate.      Finally, given his previous good outcome with physical therapy, I am referring him back for balance training.  Note that I have specifically indicated that he has an MRI conditional stent on the MRI orders and have explained to him that it is very important that he bring the card with MRI information when the scan is performed.  Among the limitations are that a scanner must be no greater than 3 Larissa field strength; higher field strengths are not used in clinical practice, so I do not have a concern about this. I also spoke with a partner of his vascular surgeon, who indicated the duration of stent placement is not a concern.     I will review results with Mr. Nova when they are available and take next steps depending upon findings.      Sincerely,         MD JEANMARIE Fischer MD             D: 10/22/2020   T: 10/22/2020   MT: al      Name:     STERLING NOVA   MRN:      [id number]        Account:      [id number]   :      1940           Service Date: 10/22/2020      Document: [id number]          Again, thank you for allowing me to participate in the care of your patient.      Sincerely,    Jeanmarie Humphrey MD

## 2020-10-22 NOTE — PROGRESS NOTES
80 year old man     Review of Systems  No past medical history on file.   Social History     Socioeconomic History     Marital status:      Spouse name: None     Number of children: None     Years of education: None     Highest education level: None   Occupational History     None   Social Needs     Financial resource strain: None     Food insecurity     Worry: None     Inability: None     Transportation needs     Medical: None     Non-medical: None   Tobacco Use     Smoking status: Former Smoker     Years: 10.00     Smokeless tobacco: Never Used   Substance and Sexual Activity     Alcohol use: Yes     Drug use: None     Sexual activity: Never   Lifestyle     Physical activity     Days per week: None     Minutes per session: None     Stress: None   Relationships     Social connections     Talks on phone: None     Gets together: None     Attends Yarsanism service: None     Active member of club or organization: None     Attends meetings of clubs or organizations: None     Relationship status: None     Intimate partner violence     Fear of current or ex partner: None     Emotionally abused: None     Physically abused: None     Forced sexual activity: None   Other Topics Concern     None   Social History Narrative     None      No family history on file.       Mental state: Alert, appropriate, speech, language, and thought content normal.     Cranial nerves II-XII normal.    Sensory examination:     Right Left   Light touch Normal Normal   Vibration (timed)     Vibration (Rydell-Seiffer) 4 4   Temp     Pin Normal Normal   Pos     Legend:   MM = medial malleolus, TT = tibial tuberosity, K = patella, MCP = MCP joint  MF = mid-foot, DC = distal calf, MC = mid calf, PC = proximal calf      Motor examination:     Right Left   Shoulder abduction  5 5   Elbow extension 5 5   Elbow flexion 5 5   Wrist extension  5 5   Finger extension 5 5   FDI 5 5   APB 5 5   Hip flexion 5 5   Knee flexion 5 5   Knee extension 5 5    Dorsiflexion 5 5   Plantar flexion 5 5   A=atrophy    Tone equivocal increase left upper and lower limbs     Reflexes:   Right Left   Biceps 2 2   BRD 2 2   Triceps 2 2   Chuy 2 2   Patellar 3 3   Achilles 2+ 2+   Plantar Flexor Flexor   Clonus Absent Absent      Coordination:  Finger-nose normal.  Heel-shin mild dysmetria, R>L  RRMs trace clumsiness UEs    Gait:  Normal base and posture. Steps a bit short and choppy. Arm swing WBNL. Modest extra steps when turning. Some difficulty toe walking, more heel walking, sandra left. Great difficulty with tandem, nearly freezes. Romberg negative. Not retropulsive.      Impression:  Query myelopathy; movement disorder less likely.    Recommendations:

## 2020-10-22 NOTE — PROGRESS NOTES
Service Date: 10/22/2020      DO [facility]   [address] [address]      RE: Andi Parrish   MRN: [id number]   : 1940      Dear Dr. Martin:      I saw Andi Parrish in neuromuscular consultation today for further evaluation of his symptoms in the hands and gait difficulty.  Mr. 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  He recalls seeing a physical therapist on one occasion for gait training and found that helpful, but did not follow up.  He now reports a chief complaint of difficulty walking, which has worsened in recent months or years.  Specifically, he indicates that his legs get heavy after walking about 1 block.  He denies radicular symptoms, but does have some postural low back pain.  He also reports gait imbalance that is present at all times.  He reports some numbness in his feet, which has been present for several years, but has not changed.  Mr. Parrish also indicates that he has had infrequent occasions of cramping of the right hand and one occasion of cramping in the left hand.      PAST MEDICAL HISTORY:  Notable for peripheral vascular disease.  He most recently underwent placement of a carotid stent 1-2 months ago.  He has not had a documented stroke.  He does not smoke.  He indicates he has about 2 alcoholic beverages every evening and drank slightly more prior to his stent 4-6 weeks ago.      PHYSICAL EXAMINATION:  General physical examination is notable for a healthy-appearing man.  Vital signs are normal.  Skin is intact.  There are no disproportionate degenerative joint changes for a person of his age.  There is full range of motion at the cervical spine.       80 year old man     Review of Systems  No past medical history on file.   Social History     Socioeconomic History     Marital status:      Spouse name: None     Number of children:  None     Years of education: None     Highest education level: None   Occupational History     None   Social Needs     Financial resource strain: None     Food insecurity     Worry: None     Inability: None     Transportation needs     Medical: None     Non-medical: None   Tobacco Use     Smoking status: Former Smoker     Years: 10.00     Smokeless tobacco: Never Used   Substance and Sexual Activity     Alcohol use: Yes     Drug use: None     Sexual activity: Never   Lifestyle     Physical activity     Days per week: None     Minutes per session: None     Stress: None   Relationships     Social connections     Talks on phone: None     Gets together: None     Attends [employer] service: None     Active member of club or organization: None     Attends meetings of clubs or organizations: None     Relationship status: None     Intimate partner violence     Fear of current or ex partner: None     Emotionally abused: None     Physically abused: None     Forced sexual activity: None   Other Topics Concern     None   Social History Narrative     None      No family history on file.       NEUROLOGIC EXAMINATION:  As follows:     Mental state: Alert, appropriate, speech, language, and thought content normal.     Cranial nerves II-XII normal.    Sensory examination:     Right Left   Light touch Normal Normal   Vibration (timed)     Vibration (Rydell-Seiffer) 4 4   Temp     Pin Normal Normal   Pos     Legend:   MM = medial malleolus, TT = tibial tuberosity, K = patella, MCP = MCP joint  MF = mid-foot, DC = distal calf, MC = mid calf, PC = proximal calf      Motor examination:     Right Left   Shoulder abduction  5 5   Elbow extension 5 5   Elbow flexion 5 5   Wrist extension  5 5   Finger extension 5 5   FDI 5 5   APB 5 5   Hip flexion 5 5   Knee flexion 5 5   Knee extension 5 5   Dorsiflexion 5 5   Plantar flexion 5 5   A=atrophy    Tone equivocal increase left upper and lower limbs     Reflexes:   Right Left   Biceps 2 2   BRD  2 2   Triceps 2 2   Chuy 2 2   Patellar 3 3   Achilles 2+ 2+   Plantar Flexor Flexor   Clonus Absent Absent      Coordination:  Finger-nose normal.  Heel-shin mild dysmetria, R>L  RRMs trace clumsiness UEs    Gait:  Normal base and posture. Steps a bit short and choppy. Arm swing WBNL. Modest extra steps when turning. Some difficulty toe walking, more heel walking, sandra left. Great difficulty with tandem, nearly freezes. Romberg negative. Not retropulsive.    Mr. Parrish presents with a chief complaint of a gait difficulty, as well as gait limitation in the context of known and treated peripheral arterial disease.  His gait is modestly abnormal without compelling diagnostic features.  He does have unusually brisk patellar reflexes.  Prior brain MRI scans demonstrate a signal change in the basal ganglia, which is symmetric and most likely benign and possible mild atrophy of the mid-brain.      I am obtaining a cervical and brain MRI, the former for consideration of cervical canal stenosis, given his relatively brisk reflexes and slightly slow rapid repetitive movements on the left, as well as the equivocal increase in tone on the left.  The former, the brain MRI, is not likely to be substantially different than those obtained in 2017, but I am obtaining this to look for any stigmata of certain extrapyramidal syndromes, which I think somewhat unlikely.  I am obtaining a vitamin B12 level and explained to him that with an exquisitely well-preserved vibration sense in the feet, it is unlikely that the B12 deficiency is the cause of his problem, but it would be inappropriate to overlook this possibility.  Finally, given his gait limitation, it is, of course, quite possible that he has recurrent vascular or spinal claudication; however, I am taken more by the modest abnormality of his gait at rest and patellar hyperreflexia.  I could repeat lumbar imaging at a later date and certainly you or his vascular specialist is  welcome to do further vascular studies if deemed appropriate.      Finally, given his previous good outcome with physical therapy, I am referring him back for balance training.  Note that I have specifically indicated that he has an MRI conditional stent on the MRI orders and have explained to him that it is very important that he bring the card with MRI information when the scan is performed.  Among the limitations are that a scanner must be no greater than 3 Larissa field strength; higher field strengths are not used in clinical practice, so I do not have a concern about this. I also spoke with a partner of his vascular surgeon, who indicated the duration of stent placement is not a concern.     I will review results with Mr. Nova when they are available and take next steps depending upon findings.      Sincerely,         MD MELE Fischer MD             D: 10/22/2020   T: 10/22/2020   MT: al      Name:     STERLING NOVA   MRN:      [id number]        Account:      [id number]   :      1940           Service Date: 10/22/2020      Document: [id number]

## 2020-10-26 LAB — VIT B1 BLD-MCNC: 135 NMOL/L (ref 70–180)

## 2020-10-29 LAB — METHYLMALONATE SERPL-SCNC: 0.24 UMOL/L (ref 0–0.4)

## 2020-11-16 ENCOUNTER — THERAPY VISIT (OUTPATIENT)
Dept: PHYSICAL THERAPY | Facility: CLINIC | Age: 80
End: 2020-11-16
Attending: PSYCHIATRY & NEUROLOGY
Payer: MEDICARE

## 2020-11-16 DIAGNOSIS — R26.9 ABNORMAL GAIT: ICD-10-CM

## 2020-11-16 PROCEDURE — 97161 PT EVAL LOW COMPLEX 20 MIN: CPT | Mod: GP | Performed by: PHYSICAL THERAPIST

## 2020-11-16 ASSESSMENT — 6 MINUTE WALK TEST (6MWT): TOTAL DISTANCE WALKED (FT): 820

## 2020-11-16 NOTE — PROGRESS NOTES
Rehabilitation Services        OUTPATIENT PHYSICAL THERAPY FUNCTIONAL EVALUATION  PLAN OF TREATMENT FOR OUTPATIENT REHABILITATION  (COMPLETE FOR INITIAL CLAIMS ONLY)  Patient's Last Name, First Name, M.I.  YOB: 1940  Andi Parrish     Provider's Name   Elizabeth Dominguez PT   Medical Record No.  [id number]     Start of Care Date:  11/16/20   Onset Date:  10/22/20   Type:     _X__PT   ____OT  ____SLP Medical Diagnosis:  Abnormal gait     PT Diagnosis:    Visits from SOC:  1                              __________________________________________________________________________________  Plan of Treatment/Functional Goals:      eval only        GOALS          Therapy Frequency:    Evaluation only  Predicted Duration of Therapy Intervention:   1 day    Elizabeth Dominguez PT                                    I CERTIFY THE NEED FOR THESE SERVICES FURNISHED UNDER        THIS PLAN OF TREATMENT AND WHILE UNDER MY CARE     (Physician attestation of this document indicates review and certification of the therapy plan).                Certification Date From:  11/16/20   Certification Date To:  11/16/20    Referring Provider:  Jeanmarie Humphrey MD    Initial Assessment  See Epic Evaluation- Start of Care Date: 11/16/20

## 2020-11-16 NOTE — PROGRESS NOTES
11/16/20 1000   Quick Adds   Quick Adds Certification   Type of Visit Initial OP PT Evaluation   General Information   Start of Care Date 11/16/20   Referring Physician Jeanmarie Humphrey MD   Orders Evaluate and Treat as Indicated   Order Date 10/22/20   Medical Diagnosis Abnormal gait (R26.9)   Onset of illness/injury or Date of Surgery 10/22/20   Surgical/Medical history reviewed Yes   Pertinent history of current problem (include personal factors and/or comorbidities that impact the POC) Patient with history of PVD; progressive- receives ultrasounds frequently for assessment. Patient underwent stent placement a few weeks ago; reports increased gait instability following surgery. Patient reports increased instability on stairs without railing. Patient walks on treadmill 10-15 minutes/day holding onto BHR. Patient reports instability with turns or getting dressed. PMH: neuropathy, PVD, history of R side hearing loss (2017)   Pertinent Visual History  goes to eye doctor every 5 weeks for injections   Prior level of function comment Modified independent; increased energy prior   Patient role/Employment history Employed  (Owns [employer])   [facility]   Home/Community Accessibility Comments lives in apartment; has gym access   Assistive Devices Comments None   Patient/Family Goals Statement To increase energy levels   Fall Risk Screen   Fall screen completed by PT   Have you fallen 2 or more times in the past year? No   Have you fallen and had an injury in the past year? No   Timed Up and Go score (seconds) 11.4   Is patient a fall risk? No   Vitals Signs   Heart Rate 71   SpO2 99   Blood Pressure 157/76   Vital Signs Comments s/p /69, 98%, HR 65   Cognitive Status Examination   Orientation orientation to person, place and time   Level of Consciousness alert   Follows Commands and Answers Questions 100% of the time;able to follow multistep instructions   Personal Safety and Judgment intact    Memory intact   Posture   Posture Forward head position   Strength   Strength Comments R hip flexion 3-/5, R knee flexion/extension 4/5   Gait   Gait Comments Patient ambulates without a device modified independent; decreased foot clearance and heel strike. Slow hay and decreased arm swing   Gait Special Tests   Gait Special Tests SIX MINUTE WALK TEST   Gait Special Tests Six Minute Walk Test   Feet 820 Feet   Comments BLE fatigue/heaviness, mild pain   Balance Special Tests   Balance Special Tests Modified CTSIB Conditions;Timed up and go   Balance Special Tests Timed Up and Go   Seconds 11.4 Seconds   Balance Special Tests Modified CTSIB Conditions   Condition 1, seconds 30 Seconds   Condition 2, seconds 30 Seconds   Condition 4, seconds 30 Seconds   Condition 5, seconds 30 Seconds   Sensory Examination   Sensory Perception Comments Neuropathy in B feet; numbness/tingling   Coordination   Coordination no deficits were identified   Clinical Impression   Criteria for Skilled Therapeutic Interventions Met evaluation only   Clinical Impression Comments Patient is an 80-year-old male who presents outpatient physical therapy with reports of imbalance and difficulty walking since carotid stent surgery a few weeks ago.  On clinical examination patient's balance systems test within normal limits, however he demonstrates significant cardiovascular deconditioning on the 6-minute walk test along with increased symptoms in his legs.  Patient does demonstrate right lower extremity proximal weakness most notably in his hip flexors and hamstring musculature, however this is not apparent on functional testing.  Educated patient on change in cardiovascular program including dictionary bike versus treadmill and possible referral to cardiac rehab for peripheral vascular disease rehabilitation; patient reports will follow up with primary doctor about this.  No skilled physical therapy needs at this time   Total Evaluation Time    PT Eval, Low Complexity Minutes (09760) 45   Therapy Certification   Certification date from 11/16/20   Certification date to 11/16/20   Medical Diagnosis Abnormal gait   Certification I certify the need for these services furnished under this plan of treatment and while under my care.  (Physician co-signature of this document indicates review and certification of the therapy plan).     Elizabeth Dominguez PT, DPT  Physical Therapist  [facility] [address]  [address] [place] [address]  [email or web address]  Office: [phone]

## 2020-11-16 NOTE — DISCHARGE INSTRUCTIONS
Ask about cardiac rehab through [facility] at your next doctor's visit. Peripheral vascular disease rehab to help with the heaviness that you get in your legs when walking.

## 2020-11-24 ENCOUNTER — ANCILLARY PROCEDURE (OUTPATIENT)
Dept: MRI IMAGING | Facility: CLINIC | Age: 80
End: 2020-11-24
Attending: PSYCHIATRY & NEUROLOGY
Payer: MEDICARE

## 2020-11-24 DIAGNOSIS — R26.9 ABNORMAL GAIT: ICD-10-CM

## 2020-11-24 PROCEDURE — 72141 MRI NECK SPINE W/O DYE: CPT | Mod: GC | Performed by: STUDENT IN AN ORGANIZED HEALTH CARE EDUCATION/TRAINING PROGRAM

## 2020-11-24 PROCEDURE — 70551 MRI BRAIN STEM W/O DYE: CPT | Mod: GC | Performed by: RADIOLOGY

## 2020-11-27 VITALS — HEIGHT: 65 IN | WEIGHT: 167 LBS | BODY MASS INDEX: 27.82 KG/M2

## 2020-11-27 ASSESSMENT — MIFFLIN-ST. JEOR: SCORE: 1389.39

## 2020-11-27 NOTE — PROGRESS NOTES
"Andi Parrish is a 80 year old male who is being evaluated via a billable video visit.      The patient has been notified of following:     \"This video visit will be conducted via a call between you and your physician/provider. We have found that certain health care needs can be provided without the need for an in-person physical exam.  This service lets us provide the care you need with a video conversation.  If a prescription is necessary we can send it directly to your pharmacy.  If lab work is needed we can place an order for that and you can then stop by our lab to have the test done at a later time.    Video visits are billed at different rates depending on your insurance coverage.  Please reach out to your insurance provider with any questions.    If during the course of the call the physician/provider feels a video visit is not appropriate, you will not be charged for this service.\"    Patient has given verbal consent for Video visit? Yes  How would you like to obtain your AVS? MyChart  If you are dropped from the video visit, the video invite should be resent to: Text to cell phone: [phone]  Will anyone else be joining your video visit? No    79 yo male with challenging sleep issue related to burning mouth syndrome and insomnia.  In addition there is a concern for sleep disordered breathing.     Referred by Dr Contreras to help address.    Attempted video visit due to the complicated nature of his condition however equipment was not available on his end. We switched to phone.      It is pretty clear he has a combination of hypervigilance as well as a circadian advance.     He also snores and has had witnessed apneas    He is currently taking 6mg of melatonin at bedtime which is likely making his circadian rhythm problem worse.     Discussed benefits of bright light (he has ocular disease) in the evening and if he does take melatonin take only 3mg (or less) and take it during a middle of the night " awakening.  Not to fall back asleep but to move his sleep rhythm later over time.      I would advise against mixing zolpidem and clonazepam.  If he is not taking his full prescribed dose of clonazepam at night I suggested that he could do that.  We spent quite a bit of time going over risks including morning sedation, impairment while driving, falls and hip fractures.  He indicates he understands.      Prior to starting light box therapy I asked him to talk to his opthamologist and he indicated he would do so.  If he is cleared he should use it for 30-60 minutes in the evening.      In addition to bright light and low dose melatonin during middle of the night awakenings we discussed CBT-I and he is interested.  I will put in an order.     We will also go ahead and order a PSG for concerns regarding sleep disordered breathing.       Advised about avoiding alcohol. Advised to never drive if tired or sleepy.      Video-Visit Details    Type of service:  Video Visit    Video Start Time: 1001  Video End Time: 1041    Originating Location (pt. Location): Home    Distant Location (provider location):  [facility]     Platform used for Video Visit: Attempted video but did not work, switched to phone.      Asad Orta MD

## 2020-11-27 NOTE — PATIENT INSTRUCTIONS
Your BMI is Body mass index is 27.79 kg/m .  Weight management is a personal decision.  If you are interested in exploring weight loss strategies, the following discussion covers the approaches that may be successful. Body mass index (BMI) is one way to tell whether you are at a healthy weight, overweight, or obese. It measures your weight in relation to your height.  A BMI of 18.5 to 24.9 is in the healthy range. A person with a BMI of 25 to 29.9 is considered overweight, and someone with a BMI of 30 or greater is considered obese. More than two-thirds of American adults are considered overweight or obese.  Being overweight or obese increases the risk for further weight gain. Excess weight may lead to heart disease and diabetes.  Creating and following plans for healthy eating and physical activity may help you improve your health.  Weight control is part of healthy lifestyle and includes exercise, emotional health, and healthy eating habits. Careful eating habits lifelong are the mainstay of weight control. Though there are significant health benefits from weight loss, long-term weight loss with diet alone may be very difficult to achieve- studies show long-term success with dietary management in less than 10% of people. Attaining a healthy weight may be especially difficult to achieve in those with severe obesity. In some cases, medications, devices and surgical management might be considered.  What can you do?  If you are overweight or obese and are interested in methods for weight loss, you should discuss this with your provider.     Consider reducing daily calorie intake by 500 calories.     Keep a food journal.     Avoiding skipping meals, consider cutting portions instead.    Diet combined with exercise helps maintain muscle while optimizing fat loss. Strength training is particularly important for building and maintaining muscle mass. Exercise helps reduce stress, increase energy, and improves fitness.  Increasing exercise without diet control, however, may not burn enough calories to loose weight.       Start walking three days a week 10-20 minutes at a time    Work towards walking thirty minutes five days a week     Eventually, increase the speed of your walking for 1-2 minutes at time    In addition, we recommend that you review healthy lifestyles and methods for weight loss available through the National Institutes of Health patient information sites:  http://win.niddk.nih.gov/publications/index.htm    And look into health and wellness programs that may be available through your health insurance provider, employer, local community center, or jnenifer club.    Weight management plan: Discussed healthy diet and exercise guidelines

## 2020-11-30 ENCOUNTER — VIRTUAL VISIT (OUTPATIENT)
Dept: SLEEP MEDICINE | Facility: CLINIC | Age: 80
End: 2020-11-30
Payer: MEDICARE

## 2020-11-30 DIAGNOSIS — G47.33 OSA (OBSTRUCTIVE SLEEP APNEA): Primary | ICD-10-CM

## 2020-11-30 DIAGNOSIS — F51.04 PSYCHOPHYSIOLOGICAL INSOMNIA: ICD-10-CM

## 2020-11-30 PROCEDURE — 99443 PR PHYSICIAN TELEPHONE EVALUATION 21-30 MIN: CPT | Performed by: PSYCHIATRY & NEUROLOGY

## 2020-12-14 NOTE — TELEPHONE ENCOUNTER
RECORDS RECEIVED FROM: Internal   Date of Appt: 1/7/21   NOTES (FOR ALL VISITS) STATUS DETAILS   OFFICE NOTE from referring provider Internal Dr Humphrey @ [facility]:  10/22/20   OFFICE NOTE from other specialist N/A    DISCHARGE SUMMARY from hospital N/A    DISCHARGE REPORT from the ER N/A    OPERATIVE REPORT N/A    MEDICATION LIST Internal    IMAGING  (FOR ALL VISITS)     EMG N/A    EEG N/A    LUMBAR PUNCTURE N/A    CALI SCAN N/A    DEXA SCAN *NEUROSURGERY* N/A    ULTRASOUND (CAROTID BILAT) *VASCULAR* N/A    MRI (HEAD, NECK, SPINE) Internal [facility]:  MRI Brain 6/24/17   XRAY (SPINE) *NEUROSURGERY* N/A    CT (HEAD, NECK, SPINE) N/A

## 2020-12-16 PROBLEM — I21.4 NSTEMI (NON-ST ELEVATED MYOCARDIAL INFARCTION) (H): Status: ACTIVE | Noted: 2020-09-05

## 2020-12-16 PROBLEM — I67.89 ACUTE, BUT ILL-DEFINED, CEREBROVASCULAR DISEASE: Status: ACTIVE | Noted: 2019-12-05

## 2020-12-16 PROBLEM — E87.1 HYPONATREMIA: Status: ACTIVE | Noted: 2020-09-02

## 2020-12-16 PROBLEM — K57.32 DIVERTICULITIS OF COLON: Status: ACTIVE | Noted: 2019-12-05

## 2020-12-16 PROBLEM — H54.7 VISUAL IMPAIRMENT: Status: ACTIVE | Noted: 2019-12-05

## 2020-12-16 PROBLEM — Z95.1 POSTSURGICAL AORTOCORONARY BYPASS STATUS: Status: ACTIVE | Noted: 2019-12-05

## 2020-12-16 PROBLEM — G60.8 PERIPHERAL SENSORY NEUROPATHY: Status: ACTIVE | Noted: 2017-02-22

## 2020-12-16 PROBLEM — E78.00 PURE HYPERCHOLESTEROLEMIA: Status: ACTIVE | Noted: 2019-12-05

## 2020-12-16 PROBLEM — E11.9 TYPE 2 DIABETES MELLITUS (H): Status: ACTIVE | Noted: 2019-08-01

## 2020-12-16 PROBLEM — I77.9 PERIPHERAL ARTERIAL OCCLUSIVE DISEASE (H): Status: ACTIVE | Noted: 2019-12-05

## 2020-12-16 PROBLEM — I20.89 ANGINA AT REST (H): Status: ACTIVE | Noted: 2019-08-01

## 2020-12-16 PROBLEM — R51.9 FACIAL PAIN: Status: ACTIVE | Noted: 2019-04-18

## 2020-12-16 PROBLEM — I10 ESSENTIAL HYPERTENSION: Status: ACTIVE | Noted: 2019-12-05

## 2020-12-16 PROBLEM — N17.9 ACUTE KIDNEY INJURY SUPERIMPOSED ON CHRONIC KIDNEY DISEASE (H): Status: ACTIVE | Noted: 2020-09-04

## 2020-12-16 PROBLEM — I73.9 PERIPHERAL VASCULAR DISEASE (H): Status: ACTIVE | Noted: 2020-12-16

## 2020-12-16 PROBLEM — E87.5 HYPERKALEMIA: Status: ACTIVE | Noted: 2020-09-04

## 2020-12-16 PROBLEM — F41.9 ANXIETY DISORDER: Status: ACTIVE | Noted: 2020-09-02

## 2020-12-16 PROBLEM — F51.01 PRIMARY INSOMNIA: Status: ACTIVE | Noted: 2019-04-18

## 2020-12-16 PROBLEM — N18.30 STAGE 3 CHRONIC KIDNEY DISEASE (H): Status: ACTIVE | Noted: 2018-05-10

## 2020-12-16 PROBLEM — I25.10 CORONARY ATHEROSCLEROSIS: Status: ACTIVE | Noted: 2019-12-05

## 2020-12-16 PROBLEM — F34.1 DYSTHYMIC DISORDER: Status: ACTIVE | Noted: 2019-12-05

## 2020-12-16 PROBLEM — N18.9 ACUTE KIDNEY INJURY SUPERIMPOSED ON CHRONIC KIDNEY DISEASE (H): Status: ACTIVE | Noted: 2020-09-04

## 2020-12-16 PROBLEM — J96.01 ACUTE RESPIRATORY FAILURE WITH HYPOXIA (H): Status: ACTIVE | Noted: 2020-09-05

## 2020-12-16 PROBLEM — K64.9 HEMORRHOIDS: Status: ACTIVE | Noted: 2019-12-05

## 2020-12-16 PROBLEM — Z00.00 ROUTINE GENERAL MEDICAL EXAMINATION AT A HEALTH CARE FACILITY: Status: ACTIVE | Noted: 2020-12-16

## 2020-12-16 PROBLEM — I73.9 INTERMITTENT CLAUDICATION (H): Status: ACTIVE | Noted: 2018-08-30

## 2020-12-16 RX ORDER — TRIAMCINOLONE ACETONIDE 0.1 %
PASTE (GRAM) DENTAL
COMMUNITY
Start: 2020-10-20

## 2020-12-16 RX ORDER — ZOLPIDEM TARTRATE 5 MG/1
5 TABLET ORAL
COMMUNITY
Start: 2020-12-04 | End: 2021-01-07

## 2020-12-16 RX ORDER — TAMSULOSIN HYDROCHLORIDE 0.4 MG/1
0.4 CAPSULE ORAL DAILY
COMMUNITY
Start: 2020-11-16

## 2020-12-16 RX ORDER — FLUVASTATIN 40 MG/1
40 CAPSULE ORAL AT BEDTIME
COMMUNITY
Start: 2020-12-09

## 2020-12-16 RX ORDER — AMLODIPINE BESYLATE 5 MG/1
5 TABLET ORAL
COMMUNITY
Start: 2020-12-03 | End: 2021-01-07

## 2020-12-16 RX ORDER — LISINOPRIL 5 MG/1
5 TABLET ORAL
COMMUNITY
Start: 2020-12-03 | End: 2021-01-07

## 2020-12-19 ENCOUNTER — MEDICAL CORRESPONDENCE (OUTPATIENT)
Dept: HEALTH INFORMATION MANAGEMENT | Facility: CLINIC | Age: 80
End: 2020-12-19

## 2020-12-19 ENCOUNTER — TRANSFERRED RECORDS (OUTPATIENT)
Dept: HEALTH INFORMATION MANAGEMENT | Facility: CLINIC | Age: 80
End: 2020-12-19

## 2020-12-20 LAB — EJECTION FRACTION: 40 %

## 2021-01-03 PROBLEM — Z92.89 H/O MAGNETIC RESONANCE IMAGING OF BRAIN AND BRAIN STEM: Status: ACTIVE | Noted: 2021-01-03

## 2021-01-03 PROBLEM — I25.10 CORONARY ARTERY DISEASE INVOLVING NATIVE CORONARY ARTERY WITHOUT ANGINA PECTORIS: Status: ACTIVE | Noted: 2021-01-03

## 2021-01-03 PROBLEM — N17.9 AKI (ACUTE KIDNEY INJURY) (H): Status: ACTIVE | Noted: 2021-01-03

## 2021-01-03 PROBLEM — I50.21 ACUTE SYSTOLIC HEART FAILURE (H): Status: ACTIVE | Noted: 2021-01-03

## 2021-01-03 PROBLEM — Z82.0 FAMILY HISTORY OF NEUROLOGIC DISORDER: Status: ACTIVE | Noted: 2021-01-03

## 2021-01-03 PROBLEM — I44.2 COMPLETE HEART BLOCK (H): Status: ACTIVE | Noted: 2021-01-03

## 2021-01-03 RX ORDER — HYDRALAZINE HYDROCHLORIDE 10 MG/1
10 TABLET, FILM COATED ORAL 2 TIMES DAILY
COMMUNITY
Start: 2020-12-28 | End: 2021-01-07

## 2021-01-03 RX ORDER — METOPROLOL SUCCINATE 25 MG/1
25 TABLET, EXTENDED RELEASE ORAL AT BEDTIME
COMMUNITY
Start: 2020-12-28 | End: 2021-01-07

## 2021-01-03 RX ORDER — ISOSORBIDE MONONITRATE 30 MG/1
30 TABLET, EXTENDED RELEASE ORAL DAILY
COMMUNITY
Start: 2020-12-18 | End: 2021-01-07

## 2021-01-03 RX ORDER — CLONAZEPAM 1 MG/1
TABLET, ORALLY DISINTEGRATING ORAL
COMMUNITY
End: 2021-01-07

## 2021-01-03 RX ORDER — FUROSEMIDE 40 MG
40 TABLET ORAL DAILY
COMMUNITY
Start: 2020-12-29 | End: 2021-01-07

## 2021-01-03 NOTE — PROGRESS NOTES
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 None     Sexual activity: Never   Lifestyle     Physical activity       Days per week: None       Minutes per session: None     Stress: None   Relationships     Social connections       Talks on phone: None       Gets together: None       Attends [employer] service: None       Active member of club or organization: None       Attends meetings of clubs or organizations: None       Relationship status: None     Intimate partner violence       Fear of current or ex partner: None       Emotionally abused: None       Physically abused: None       Forced sexual activity: None   Other Topics Concern     None   Social History Narrative     None      No family history on file.         NEUROLOGIC EXAMINATION:  As follows:      Mental state: Alert, appropriate, speech, language, and thought content normal.      Cranial nerves II-XII normal.     Sensory examination:       Right Left   Light touch Normal Normal   Vibration (timed)       Vibration (Rydell-Seiffer) 4 4   Temp       Pin  Normal Normal   Pos       Legend:   MM = medial malleolus, TT = tibial tuberosity, K = patella, MCP = MCP joint  MF = mid-foot, DC = distal calf, MC = mid calf, PC = proximal calf        Motor examination:       Right Left   Shoulder abduction        5 5   Elbow extension 5 5   Elbow flexion 5 5   Wrist extension         5 5   Finger extension 5 5   FDI 5 5   APB 5 5   Hip flexion 5 5   Knee flexion 5 5   Knee extension 5 5   Dorsiflexion 5 5   Plantar flexion 5 5   A=atrophy     Tone equivocal increase left upper and lower limbs     Reflexes:    Right Left   Biceps 2 2   BRD 2 2   Triceps 2 2   Chuy 2 2   Patellar 3 3   Achilles 2+ 2+   Plantar Flexor Flexor   Clonus Absent Absent      Coordination:  Finger-nose normal.  Heel-shin mild dysmetria, R>L  RRMs trace clumsiness UEs     Gait:  Normal base and posture. Steps a bit short and choppy. Arm swing WBNL. Modest extra steps when turning. Some difficulty toe walking, more heel walking, sandra left. Great difficulty with tandem, nearly freezes. Romberg negative. Not retropulsive.     Mr. Parrish presents with a chief complaint of a gait difficulty, as well as gait limitation in the context of known and treated peripheral arterial disease.  His gait is modestly abnormal without compelling diagnostic features.  He does have unusually brisk patellar reflexes.  Prior brain MRI scans demonstrate a signal change in the basal ganglia, which is symmetric and most likely benign and possible mild atrophy of the mid-brain.      I am obtaining a cervical and brain MRI, the former for consideration of cervical canal stenosis, given his relatively brisk reflexes and slightly slow rapid repetitive movements on the left, as well as the equivocal increase in tone on the left.  The former, the brain MRI, is not likely to be substantially different than those obtained in 2017, but I am obtaining this to look for any stigmata of certain extrapyramidal syndromes, which I think  somewhat unlikely.  I am obtaining a vitamin B12 level and explained to him that with an exquisitely well-preserved vibration sense in the feet, it is unlikely that the B12 deficiency is the cause of his problem, but it would be inappropriate to overlook this possibility.  Finally, given his gait limitation, it is, of course, quite possible that he has recurrent vascular or spinal claudication; however, I am taken more by the modest abnormality of his gait at rest and patellar hyperreflexia.  I could repeat lumbar imaging at a later date and certainly you or his vascular specialist is welcome to do further vascular studies if deemed appropriate.      Finally, given his previous good outcome with physical therapy, I am referring him back for balance training.  Note that I have specifically indicated that he has an MRI conditional stent on the MRI orders and have explained to him that it is very important that he bring the card with MRI information when the scan is performed.  Among the limitations are that a scanner must be no greater than 3 Larissa field strength; higher field strengths are not used in clinical practice, so I do not have a concern about this. I also spoke with a partner of his vascular surgeon, who indicated the duration of stent placement is not a concern.     I will review results with Mr. Nova when they are available and take next steps depending upon findings.      Sincerely,         MD MELE Fischer MD             D: 10/22/2020   T: 10/22/2020   MT: al      Name:     STERLING NOVA   MRN:      [id number]        Account:      [id number]   :      1940           Service Date: 10/22/2020      Document: [id number]

## 2021-01-07 ENCOUNTER — VIRTUAL VISIT (OUTPATIENT)
Dept: NEUROLOGY | Facility: CLINIC | Age: 81
End: 2021-01-07
Payer: MEDICARE

## 2021-01-07 ENCOUNTER — PRE VISIT (OUTPATIENT)
Dept: NEUROLOGY | Facility: CLINIC | Age: 81
End: 2021-01-07

## 2021-01-07 DIAGNOSIS — Z82.0 FAMILY HISTORY OF NEUROLOGIC DISORDER: ICD-10-CM

## 2021-01-07 DIAGNOSIS — R26.9 ABNORMAL GAIT: Primary | ICD-10-CM

## 2021-01-07 DIAGNOSIS — Z92.89 H/O MAGNETIC RESONANCE IMAGING OF BRAIN AND BRAIN STEM: ICD-10-CM

## 2021-01-07 PROCEDURE — 99215 OFFICE O/P EST HI 40 MIN: CPT | Mod: 95 | Performed by: PSYCHIATRY & NEUROLOGY

## 2021-01-07 RX ORDER — DOCUSATE SODIUM 100 MG/1
100 CAPSULE, LIQUID FILLED ORAL DAILY
COMMUNITY

## 2021-01-07 RX ORDER — FUROSEMIDE 40 MG
TABLET ORAL
COMMUNITY
Start: 2021-01-05

## 2021-01-07 RX ORDER — AMOXICILLIN 250 MG
1 CAPSULE ORAL DAILY PRN
COMMUNITY

## 2021-01-07 RX ORDER — ZOLPIDEM TARTRATE 5 MG/1
TABLET ORAL
COMMUNITY
Start: 2021-01-07

## 2021-01-07 RX ORDER — CLONAZEPAM 1 MG/1
TABLET ORAL
COMMUNITY
Start: 2020-12-10

## 2021-01-07 NOTE — PROGRESS NOTES
Andi Parrish is a 80 year old male who is being evaluated via a billable video visit.      How would you like to obtain your AVS? MyChart  If the video visit is dropped, the invitation should be resent by: cell  Will anyone else be joining your video visit? No        Video-Visit Details    Type of service:  Video Visit      Originating Location (pt. Location): Vehicle on his way home.    Distant Location (provider location):  [facility]     Platform used for Video Visit: [employer]
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
## 2021-01-07 NOTE — LETTER
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     Transportation needs       Medical: None       Non-medical: None   Tobacco Use     Smoking status: Former Smoker       Years: 10.00     Smokeless tobacco: Never Used   Substance and Sexual Activity     Alcohol use: Yes     Drug use: None     Sexual activity: Never   Lifestyle     Physical activity       Days per week: None       Minutes per session: None     Stress: None   Relationships     Social connections       Talks on phone: None       Gets together: None       Attends Scientology service: None       Active member of club or organization: None       Attends meetings of clubs or organizations: None       Relationship status: None     Intimate partner violence       Fear of current or ex partner: None       Emotionally abused: None       Physically abused: None       Forced sexual activity: None   Other Topics Concern     None   Social History Narrative     None      No family history on  file.         NEUROLOGIC EXAMINATION:  As follows:      Mental state: Alert, appropriate, speech, language, and thought content normal.      Cranial nerves II-XII normal.     Sensory examination:       Right Left   Light touch Normal Normal   Vibration (timed)       Vibration (Rydell-Seiffer) 4 4   Temp       Pin Normal Normal   Pos       Legend:   MM = medial malleolus, TT = tibial tuberosity, K = patella, MCP = MCP joint  MF = mid-foot, DC = distal calf, MC = mid calf, PC = proximal calf        Motor examination:       Right Left   Shoulder abduction        5 5   Elbow extension 5 5   Elbow flexion 5 5   Wrist extension         5 5   Finger extension 5 5   FDI 5 5   APB 5 5   Hip flexion 5 5   Knee flexion 5 5   Knee extension 5 5   Dorsiflexion 5 5   Plantar flexion 5 5   A=atrophy     Tone equivocal increase left upper and lower limbs     Reflexes:    Right Left   Biceps 2 2   BRD 2 2   Triceps 2 2   Chuy 2 2   Patellar 3 3   Achilles 2+ 2+   Plantar Flexor Flexor   Clonus Absent Absent      Coordination:  Finger-nose normal.  Heel-shin mild dysmetria, R>L  RRMs trace clumsiness UEs     Gait:  Normal base and posture. Steps a bit short and choppy. Arm swing WBNL. Modest extra steps when turning. Some difficulty toe walking, more heel walking, sandra left. Great difficulty with tandem, nearly freezes. Romberg negative. Not retropulsive.     Mr. Parrish presents with a chief complaint of a gait difficulty, as well as gait limitation in the context of known and treated peripheral arterial disease.  His gait is modestly abnormal without compelling diagnostic features.  He does have unusually brisk patellar reflexes.  Prior brain MRI scans demonstrate a signal change in the basal ganglia, which is symmetric and most likely benign and possible mild atrophy of the mid-brain.      I am obtaining a cervical and brain MRI, the former for consideration of cervical canal stenosis, given his relatively brisk reflexes and  slightly slow rapid repetitive movements on the left, as well as the equivocal increase in tone on the left.  The former, the brain MRI, is not likely to be substantially different than those obtained in 2017, but I am obtaining this to look for any stigmata of certain extrapyramidal syndromes, which I think somewhat unlikely.  I am obtaining a vitamin B12 level and explained to him that with an exquisitely well-preserved vibration sense in the feet, it is unlikely that the B12 deficiency is the cause of his problem, but it would be inappropriate to overlook this possibility.  Finally, given his gait limitation, it is, of course, quite possible that he has recurrent vascular or spinal claudication; however, I am taken more by the modest abnormality of his gait at rest and patellar hyperreflexia.  I could repeat lumbar imaging at a later date and certainly you or his vascular specialist is welcome to do further vascular studies if deemed appropriate.      Finally, given his previous good outcome with physical therapy, I am referring him back for balance training.  Note that I have specifically indicated that he has an MRI conditional stent on the MRI orders and have explained to him that it is very important that he bring the card with MRI information when the scan is performed.  Among the limitations are that a scanner must be no greater than 3 Larissa field strength; higher field strengths are not used in clinical practice, so I do not have a concern about this. I also spoke with a partner of his vascular surgeon, who indicated the duration of stent placement is not a concern.     I will review results with Mr. Nova when they are available and take next steps depending upon findings.      Sincerely,         MD MELE Fischer MD             D: 10/22/2020   T: 10/22/2020   MT: al      Name:     STERLING NOVA   MRN:      [id number]        Account:      [id number]   :      1940            Service Date: 10/22/2020      Document: [id number]                 Andi Parrish is a 80 year old male who is being evaluated via a billable video visit.      How would you like to obtain your AVS? MyChart  If the video visit is dropped, the invitation should be resent by: cell  Will anyone else be joining your video visit? No        Video-Visit Details    Type of service:  Video Visit      Originating Location (pt. Location): Vehicle on his way home.    Distant Location (provider location):  [facility] NEUROLOGY [facility]     Platform used for Video Visit: Lynne         Again, thank you for allowing me to participate in the care of your patient.      Sincerely,    Husam Xie MD

## 2021-01-15 ENCOUNTER — HEALTH MAINTENANCE LETTER (OUTPATIENT)
Age: 81
End: 2021-01-15

## 2021-01-28 VITALS — HEIGHT: 65 IN | WEIGHT: 160 LBS | BODY MASS INDEX: 26.66 KG/M2

## 2021-01-28 ASSESSMENT — MIFFLIN-ST. JEOR: SCORE: 1357.64

## 2021-01-28 NOTE — PATIENT INSTRUCTIONS
Your BMI is Body mass index is 26.63 kg/m .  Weight management is a personal decision.  If you are interested in exploring weight loss strategies, the following discussion covers the approaches that may be successful. Body mass index (BMI) is one way to tell whether you are at a healthy weight, overweight, or obese. It measures your weight in relation to your height.  A BMI of 18.5 to 24.9 is in the healthy range. A person with a BMI of 25 to 29.9 is considered overweight, and someone with a BMI of 30 or greater is considered obese. More than two-thirds of American adults are considered overweight or obese.  Being overweight or obese increases the risk for further weight gain. Excess weight may lead to heart disease and diabetes.  Creating and following plans for healthy eating and physical activity may help you improve your health.  Weight control is part of healthy lifestyle and includes exercise, emotional health, and healthy eating habits. Careful eating habits lifelong are the mainstay of weight control. Though there are significant health benefits from weight loss, long-term weight loss with diet alone may be very difficult to achieve- studies show long-term success with dietary management in less than 10% of people. Attaining a healthy weight may be especially difficult to achieve in those with severe obesity. In some cases, medications, devices and surgical management might be considered.  What can you do?  If you are overweight or obese and are interested in methods for weight loss, you should discuss this with your provider.     Consider reducing daily calorie intake by 500 calories.     Keep a food journal.     Avoiding skipping meals, consider cutting portions instead.    Diet combined with exercise helps maintain muscle while optimizing fat loss. Strength training is particularly important for building and maintaining muscle mass. Exercise helps reduce stress, increase energy, and improves fitness.  Increasing exercise without diet control, however, may not burn enough calories to loose weight.       Start walking three days a week 10-20 minutes at a time    Work towards walking thirty minutes five days a week     Eventually, increase the speed of your walking for 1-2 minutes at time    In addition, we recommend that you review healthy lifestyles and methods for weight loss available through the National Institutes of Health patient information sites:  http://win.niddk.nih.gov/publications/index.htm    And look into health and wellness programs that may be available through your health insurance provider, employer, local community center, or jennifer club.

## 2021-01-28 NOTE — PROGRESS NOTES
Andi is a 80 year old who is being evaluated via a billable telephone visit.      What phone number would you like to be contacted at? [phone]   How would you like to obtain your AVS? Mail a copy    Phone call duration: 40 minutes    SLEEP MEDICINE CONSULTATION  Sleep Psychology    Name: Andi Parrish MRN# [id number]   Age: 80 year old YOB: 1940     Date of Consultation: Jan 29, 2021  Consultation is requested by: Asad Orta MD  [address] [address]  Primary care provider: Mar Martin    Reason for Sleep Consultation     Andi Parrish is a 80 year old male seen today for a behavioral sleep medicine consultation because of insomnia    Assessment and Plan     Sleep Diagnoses/Recommendations:       Chronic insomnia  Circadian rhythm sleep disorder, advanced sleep phase type      Patient clinical presentation and history suggests chronic psychophysiologic insomnia with contribution from degree of hypervigilance, history of tendency towards anxiousness, multiple health related concerns, inadequate stimulus control, and advanced sleep phase.    Patient did get clearance from his ophthalmologist to utilize and even bright light box as was recommended by my colleague Asad Orta.  MD.  The major behavioral intervention will be to reduce time in bed from about 9hours to 7.5 hours with a window of 10:30 PM-6 AM.  He will use the bed only for sleep and will use of a bright light box as prescribed for 30 to 60 minutes at 8 PM.    Patient has been taking clonazepam in the later evening and Ambien at bedtime.  We discussed Dr. Orta's concern about using 2 sedative-hypnotic agents, particularly with increased risk for individuals over the age of 65.  He decided to use Ambien only in the evening at 5 mg as has been prescribed.  He also will move his exercise time on a treadmill to later in the day due to the fact that Ambien can have it a morning side effect of  the motor incoordination.  This should help reduce risk of fall in the morning.  Lastly he will utilize 3 mg melatonin in the middle of the night per Dr. Asad Orta's recommendation.  He will wean his exercise to 1 PM and follow the guidelines of his cardiologist.  Summary Counseling:      Andi was provided information about the pathophysiology of insomnia and psychophysiological factors contributing to the onset and maintenance of insomnia.  Treatment options were discussed including component of cognitive-behavioral therapy for insomnia. The benefits and potential early side effects of treatment including increased daytime sleepiness were discussed.       Patient was advised to consult with their prescribing provider around use of or changes to prescription sleep medication.  Patient was counseled on the importance of avoiding driving if drowsy.    See patient instructions for initial treatment recommendations and behavioral sleep plan.    Follow-up: 2 weeks     History of Present Sleep Complaint     Andi Parrish is a 80 year old year old male with a relevant medical history of  FAUSTINO, HTN, Depression, Anxiety, CVD, and GERD who presents with a history of chronic insomnia.  He was seen by my colleague, Dr. Asad Orta in November 2020 where he was diagnosed with psychophysiologic insomnia and.  Identification of having an advanced sleep phase.  He was advised to avoid using zolpidem and clonazepam in combination due to the risks.  He was prescribed bright light therapy 30-60 minutes in the evening provided this strategy was cleared by his ophthalmologist.  A sleep study was ordered but records for review today indicates that it was not completed.    Patient made sleep study appointment but due to acute heart attack he cancelled it.  He is feeling better now.    Patient is not concerned that he may have sleep apnea.  He is open to completing a study but would like a home study over an in lab sleep study.  " He notes that his breathing has markedly improved with pace maker. Discussed that significant cardiac history would suggest need for in lab study.    Patient has had longstanding history of insomnia.  He has used insomnia for a number of years.  He reports that zolpidem helps him to sleep without these symptoms affecting his ability to fall asleep.He also takes clonazepam at 0.5 mg during the day to diminish symptoms.    He then felt he was \"hooked\" on the zolpidem after does had escalated to 15 mg.  He tried trazodone which did not help.  He had also tried melatonin.  He had been taking 9 mg melatonin at bedtime.    After seeing Dr. Orta he stopped taking bedtime at bedtime.  Now is taking after modnight.    Currently takes clonazepam at 930 PM and it usually takes 30 minutes to fall asleep. Wakes up around midnight having to urinate.    Patient then takes melatonin and is able to fall back to sleep relatively easily after being awake for 30-60 minutes.  He reports that sleep from 3-6 AM is more fragmented. Describes this time of night as anxiout    Gets out of bed for the day consistently at 6 AM.      Current Sleep Medication/OTCs:  Zolpidem, clonazepam, melatonin    Previous Medication/OTCs:  None    Behavioral Strategies  stopped using electronics in bed, stopped watching TV in bed, keep a consistent sleep schedule    Sleep/Wake Pattern:    Shift Work - No    Source of Sleep Estimates:  verbal self-report      RNaps - occasional, 1 per week, less than 30 minutes    Sleep Habits and Behavior:    sleep-related worry, sleep effort    Sleep Environment:    Bedroom is quiet, comfortable and dark    Substance Use:    Caffeine: 1-2 beverages  Alcohol: None reported  Nicotine: None  Recreational/Illicit Drugs: None reported      Previous Sleep Studies/Consultation:    A sleep study was ordered by Dr. Asad Orta in November.  However, a recent heart attack and hospitalization for stent to cancel the appointment " "he will be scheduled         Screening       STOP-Bang FAUSTINO Risk Score:4/8  Low Risk:  0-2  Intermediate Risk:  3-4  High Risk:  5-8    Insomnia Severity Index: ALVARO Total Score: 19  Normal: 0-7  Subclinical: 8-14  Moderate:  15-21  Severe: 22-28      [place] Sleepiness Scale Score: 6  Score >10 suggests excessive daytime sleepiness      Vitals     Ht 1.651 m (5' 5\")   Wt 72.6 kg (160 lb)   BMI 26.63 kg/m       Medical History     Patient Active Problem List   Diagnosis     Abnormal cardiovascular stress test     Acute kidney injury superimposed on chronic kidney disease (H)     Acute respiratory failure with hypoxia (H)     Acute, but ill-defined, cerebrovascular disease     Angina at rest (H)     Chronic ischemic heart disease     NSTEMI (non-ST elevated myocardial infarction) (H)     Anxiety disorder     Atrial flutter (H)     Basal cell carcinoma of face     Benign prostatic hyperplasia     Carotid artery stenosis     Coronary atherosclerosis     Diverticulitis of colon     Dysthymic disorder     Esophageal reflux     Essential hypertension     Facial pain     Hemorrhoids     Hyperkalemia     Hyponatremia     Impotence of organic origin     Intermittent claudication (H)     Peripheral arterial occlusive disease (H)     Peripheral sensory neuropathy     Peripheral vascular disease (H)     Postsurgical aortocoronary bypass status     Pure hypercholesterolemia     Primary insomnia     Rosacea     Routine general medical examination at a health care facility     Stage 3 chronic kidney disease     Type 2 diabetes mellitus (H)     Visual impairment     Vitamin D deficiency     Acute systolic heart failure (H)     GLADYS (acute kidney injury) (H)     Complete heart block (H)     Coronary artery disease involving native coronary artery without angina pectoris     H/O magnetic resonance imaging of brain and brain stem     Family history of neurologic disorder in his father who had walking problems        Current Outpatient " Medications   Medication Sig Dispense Refill     aspirin 81 MG EC tablet Take by mouth daily       clonazePAM (KLONOPIN) 1 MG tablet HOLD 1/2 TAB IN MOUTH FOR 1 MIN, SWISH AND SPIT TWICE DAILY. THEN 1 TAB BY MOUTH AT BEDTIME       clopidogrel (PLAVIX) 75 MG tablet Take 1 tablet by mouth daily  3     docusate sodium (COLACE) 100 MG capsule Take 100 mg by mouth daily       doxycycline Monohydrate 100 MG CAPS as needed  5     fluvastatin (LESCOL) 40 MG capsule Take 40 mg by mouth At Bedtime        furosemide (LASIX) 40 MG tablet Take 40 mg daily prn when weight > 160 lbs.       melatonin 3 MG CAPS 3mg dose between midnight and 4am       metroNIDAZOLE (METROCREAM) 0.75 % cream as needed  3     multivitamin RENAL (NEPHROCAPS/TRIPHROCAPS) 1 MG capsule Take 1 capsule by mouth       nitroglycerin (NITROSTAT) 0.4 MG sublingual tablet Place 0.4 mg under the tongue as needed       psyllium (METAMUCIL/KONSYL) capsule Take 1 capsule by mouth daily       senna-docusate (SENOKOT-S/PERICOLACE) 8.6-50 MG tablet Take 1 tablet by mouth daily as needed for constipation       tamsulosin (FLOMAX) 0.4 MG capsule Take 0.4 mg by mouth daily       triamcinolone (KENALOG) 0.1 % paste APPLY 4 TO 5 TIMES D TO ENTIRE MOUTH UTD       zolpidem (AMBIEN) 5 MG tablet 1/2 of 5mg tab by mouth at bedtime as needed         Past Surgical History:   Procedure Laterality Date     ANGIOGRAM      PVD angiogram     CARDIAC SURGERY  1997     CAROTID ENDARTERECTOMY Left 01/27/2014     EYE SURGERY Bilateral     cataract surgery     EYE SURGERY Bilateral     gets injections both eyes initially and now only treating right eye     femoral artery bypass Left 09/06/2011     HEMORRHOIDECTOMY       IMPLANT PACEMAKER       OTHER SURGICAL HISTORY  2012    left IA throblytics and PCA Dr. Sauceda     STENT  2009    BMS to RCA graft     STENT      carotid artery stent        No Known Allergies      Mental Health History     Prior Mental Health Diagnosis: None  reported    Current Mental Health Treatment: None reported    Prior Chemical Dependency Treatment:  None reported      Family History     Family History   Problem Relation Age of Onset     Hyperlipidemia Mother      Cerebrovascular Disease Father      Neurologic Disorder Father         possible MS. had gait problems     Cancer Sister      Cerebrovascular Disease Sister      Other - See Comments Brother         annamarie ling     Dementia Sister      Neurologic Disorder Sister         sandra     Other - See Comments Daughter         , running company     Other - See Comments Other         step son works in [place] - does not to move to [place]       Family History of Sleep Disorders: None reported  Social History     Social History     Socioeconomic History     Marital status:      Spouse name: None     Number of children: None     Years of education: None     Highest education level: None   Occupational History     None   Social Needs     Financial resource strain: None     Food insecurity     Worry: None     Inability: None     Transportation needs     Medical: None     Non-medical: None   Tobacco Use     Smoking status: Former Smoker     Years: 10.00     Smokeless tobacco: Never Used   Substance and Sexual Activity     Alcohol use: Yes     Drug use: None     Sexual activity: Never   Lifestyle     Physical activity     Days per week: None     Minutes per session: None     Stress: None   Relationships     Social connections     Talks on phone: None     Gets together: None     Attends Gnosticism service: None     Active member of club or organization: None     Attends meetings of clubs or organizations: None     Relationship status: None     Intimate partner violence     Fear of current or ex partner: None     Emotionally abused: None     Physically abused: None     Forced sexual activity: None   Other Topics Concern     None   Social History Narrative    He does not smoke.  He indicates he has  about 2 alcoholic beverages every evening and drank slightly more prior to his stent 4-6 weeks ago.        Semiretired, owns a solid waste business     Mental Status Examination     Andi presented as oriented X3 with speech language intact.  The patient was cooperative throughout the evaluation with no signs of hallucinations, delusions, loosening of associations or other thought disturbance.  Mood was normal Affect was congruent with mood. Insight and judgement were intact.  Memory appeared intact for recent and remote elements.  There was no report of suicidal ideation, intention or plan. Attention and concentration were within normal.      Copy:   Mar Martin MD  [address]  [place] [address]    Asad Soriano PsyD, , [facility]  Diplomate, Behavioral Sleep Medicine  [facility]    Note: This dictation was created using voice recognition software. This document may contain an error not identified before finalizing the document. If the error changes the accuracy of the document, I would appreciate it being brought to my attention.

## 2021-01-29 ENCOUNTER — VIRTUAL VISIT (OUTPATIENT)
Dept: SLEEP MEDICINE | Facility: CLINIC | Age: 81
End: 2021-01-29
Attending: PSYCHIATRY & NEUROLOGY
Payer: MEDICARE

## 2021-01-29 DIAGNOSIS — G47.22 CIRCADIAN RHYTHM SLEEP DISORDER, ADVANCED SLEEP PHASE TYPE: ICD-10-CM

## 2021-01-29 DIAGNOSIS — F51.04 CHRONIC INSOMNIA: Primary | ICD-10-CM

## 2021-01-29 PROCEDURE — 90791 PSYCH DIAGNOSTIC EVALUATION: CPT | Performed by: PSYCHOLOGIST

## 2021-02-02 ENCOUNTER — MEDICAL CORRESPONDENCE (OUTPATIENT)
Dept: CARDIAC REHAB | Facility: CLINIC | Age: 81
End: 2021-02-02

## 2021-02-15 VITALS — BODY MASS INDEX: 26.66 KG/M2 | WEIGHT: 160 LBS | HEIGHT: 65 IN

## 2021-02-15 ASSESSMENT — MIFFLIN-ST. JEOR: SCORE: 1357.64

## 2021-02-15 NOTE — PROGRESS NOTES
"Andi is a 80 year old who is being evaluated via a billable telephone visit.      What phone number would you like to be contacted at? [phone]  How would you like to obtain your AVS? Mail a copy   Louisa Ho CMA    Phone call duration: 21 minutes      SLEEP MEDICINE  TELEPHONE FOLLOW-UP VISIT  Sleep Psychology    Patient Name: Andi Parrish  MRN:  [id number]  Date of Service: Feb 16, 2021       Subjective Report     Andi Parrish  returns for a telephone visit to discuss progress in implementing behavioral strategies for the management of insomnia.  Patient consent for initiation of telephone visit was obtained and documented prior to initiation of visit.     Andi reports he had been doing better but then he reports that he came down with fever.      He reports this resulted in him falling off his schedule somewhat.    Lately, he has been .going to be about 10 PM and then waking about midnight and taking zolpidem.  Then he awakens at 3 AM and will take melatonin.  He gets up at 6:30 AM    Is using the light box in afternoon.    Patient continues to feel fatigued and exhausted.      .     Sleep Data:     Source of Sleep Estimates:  verbal self-report      Total score - [place]: 9 .            Interventions     Strategies and recommendations including maintenance of stimulus control, managing sleep restriction/compression and management of strategies for circadian rhythm disorder were discussed today.       Vital Signs     Ht 1.651 m (5' 5\")   Wt 72.6 kg (160 lb)   BMI 26.63 kg/m       Mental Status   Orientation:  X3  Mood:  normal  Speech/Language:  Normal  Thought Process: Intact  Associations:  Normal  Thought Content: Normal  Patient does not report any suicidal ideation, intention or plan.      Diagnostic Impressions and Plan        Chronic insomnia  Circadian rhythm sleep disorder, advanced sleep phase type    Plan:  Recommend he return to initially recommended for 7.5 hours between 10:30 " PM to 6 AM.  Followup with Dr. William around use of zolpidem. for insomnia.    Follow-up: 4 weeks      Asad Soriano PsyD, MARITZA, [facility]  Diplomate, Behavioral Sleep Medicine  [facility]    Note: This dictation was created using voice recognition software. This document may contain an error not identified before finalizing the document. If the error changes the accuracy of the document, I would appreciate it being brought to my attention.

## 2021-02-16 ENCOUNTER — VIRTUAL VISIT (OUTPATIENT)
Dept: SLEEP MEDICINE | Facility: CLINIC | Age: 81
End: 2021-02-16
Payer: MEDICARE

## 2021-02-16 DIAGNOSIS — F51.04 CHRONIC INSOMNIA: Primary | ICD-10-CM

## 2021-02-16 DIAGNOSIS — G47.22 CIRCADIAN RHYTHM SLEEP DISORDER, ADVANCED SLEEP PHASE TYPE: ICD-10-CM

## 2021-02-16 PROCEDURE — 90832 PSYTX W PT 30 MINUTES: CPT | Performed by: PSYCHOLOGIST

## 2021-05-30 ENCOUNTER — HEALTH MAINTENANCE LETTER (OUTPATIENT)
Age: 81
End: 2021-05-30

## 2021-09-19 ENCOUNTER — HEALTH MAINTENANCE LETTER (OUTPATIENT)
Age: 81
End: 2021-09-19

## 2022-01-09 ENCOUNTER — HEALTH MAINTENANCE LETTER (OUTPATIENT)
Age: 82
End: 2022-01-09

## 2022-03-06 ENCOUNTER — HEALTH MAINTENANCE LETTER (OUTPATIENT)
Age: 82
End: 2022-03-06

## 2022-05-01 ENCOUNTER — HEALTH MAINTENANCE LETTER (OUTPATIENT)
Age: 82
End: 2022-05-01

## 2022-08-21 ENCOUNTER — HEALTH MAINTENANCE LETTER (OUTPATIENT)
Age: 82
End: 2022-08-21

## 2022-11-21 ENCOUNTER — HEALTH MAINTENANCE LETTER (OUTPATIENT)
Age: 82
End: 2022-11-21

## 2022-12-25 ENCOUNTER — HEALTH MAINTENANCE LETTER (OUTPATIENT)
Age: 82
End: 2022-12-25

## 2023-04-16 ENCOUNTER — HEALTH MAINTENANCE LETTER (OUTPATIENT)
Age: 83
End: 2023-04-16

## 2023-09-16 ENCOUNTER — HEALTH MAINTENANCE LETTER (OUTPATIENT)
Age: 83
End: 2023-09-16

## 2024-02-03 ENCOUNTER — HEALTH MAINTENANCE LETTER (OUTPATIENT)
Age: 84
End: 2024-02-03